# Patient Record
Sex: FEMALE | Race: BLACK OR AFRICAN AMERICAN | Employment: FULL TIME | URBAN - METROPOLITAN AREA
[De-identification: names, ages, dates, MRNs, and addresses within clinical notes are randomized per-mention and may not be internally consistent; named-entity substitution may affect disease eponyms.]

---

## 2021-10-14 ENCOUNTER — HOSPITAL ENCOUNTER (INPATIENT)
Age: 31
LOS: 7 days | Discharge: HOME | DRG: 756 | End: 2021-10-21
Attending: FAMILY MEDICINE | Admitting: PSYCHIATRY & NEUROLOGY
Payer: COMMERCIAL

## 2021-10-14 DIAGNOSIS — F29 PSYCHOSIS, UNSPECIFIED PSYCHOSIS TYPE (HCC): Primary | ICD-10-CM

## 2021-10-14 LAB
AMPHET UR QL SCN: NEGATIVE
ANION GAP SERPL CALC-SCNC: 6 MMOL/L (ref 5–15)
APAP SERPL-MCNC: <10 UG/ML (ref 10–30)
APPEARANCE UR: ABNORMAL
BACTERIA URNS QL MICRO: NEGATIVE /HPF
BARBITURATES UR QL SCN: NEGATIVE
BASOPHILS # BLD: 0 K/UL (ref 0–0.1)
BASOPHILS NFR BLD: 0 % (ref 0–1)
BENZODIAZ UR QL: NEGATIVE
BILIRUB UR QL: NEGATIVE
BUN SERPL-MCNC: 4 MG/DL (ref 6–20)
BUN/CREAT SERPL: 4 (ref 12–20)
CA-I BLD-MCNC: 9.2 MG/DL (ref 8.5–10.1)
CANNABINOIDS UR QL SCN: NEGATIVE
CHLORIDE SERPL-SCNC: 103 MMOL/L (ref 97–108)
CO2 SERPL-SCNC: 27 MMOL/L (ref 21–32)
COCAINE UR QL SCN: NEGATIVE
COLOR UR: ABNORMAL
CREAT SERPL-MCNC: 0.9 MG/DL (ref 0.55–1.02)
DIFFERENTIAL METHOD BLD: ABNORMAL
DRUG SCRN COMMENT,DRGCM: NORMAL
EOSINOPHIL # BLD: 0 K/UL (ref 0–0.4)
EOSINOPHIL NFR BLD: 0 % (ref 0–7)
ERYTHROCYTE [DISTWIDTH] IN BLOOD BY AUTOMATED COUNT: 12.4 % (ref 11.5–14.5)
GLUCOSE SERPL-MCNC: 132 MG/DL (ref 65–100)
GLUCOSE UR STRIP.AUTO-MCNC: NEGATIVE MG/DL
HCG UR QL: NEGATIVE
HCT VFR BLD AUTO: 43.1 % (ref 35–47)
HGB BLD-MCNC: 14.1 G/DL (ref 11.5–16)
HGB UR QL STRIP: NEGATIVE
IMM GRANULOCYTES # BLD AUTO: 0 K/UL (ref 0–0.04)
IMM GRANULOCYTES NFR BLD AUTO: 0 % (ref 0–0.5)
KETONES UR QL STRIP.AUTO: NEGATIVE MG/DL
LEUKOCYTE ESTERASE UR QL STRIP.AUTO: ABNORMAL
LYMPHOCYTES # BLD: 1.4 K/UL (ref 0.8–3.5)
LYMPHOCYTES NFR BLD: 13 % (ref 12–49)
MCH RBC QN AUTO: 27.9 PG (ref 26–34)
MCHC RBC AUTO-ENTMCNC: 32.7 G/DL (ref 30–36.5)
MCV RBC AUTO: 85.3 FL (ref 80–99)
METHADONE UR QL: NEGATIVE
MONOCYTES # BLD: 0.6 K/UL (ref 0–1)
MONOCYTES NFR BLD: 5 % (ref 5–13)
MUCOUS THREADS URNS QL MICRO: ABNORMAL /LPF
NEUTS SEG # BLD: 9 K/UL (ref 1.8–8)
NEUTS SEG NFR BLD: 82 % (ref 32–75)
NITRITE UR QL STRIP.AUTO: NEGATIVE
NRBC # BLD: 0 K/UL (ref 0–0.01)
NRBC BLD-RTO: 0 PER 100 WBC
OPIATES UR QL: NEGATIVE
PCP UR QL: NEGATIVE
PH UR STRIP: 6 [PH] (ref 5–8)
PLATELET # BLD AUTO: 418 K/UL (ref 150–400)
PMV BLD AUTO: 9.4 FL (ref 8.9–12.9)
POTASSIUM SERPL-SCNC: 3.5 MMOL/L (ref 3.5–5.1)
PROT UR STRIP-MCNC: NEGATIVE MG/DL
RBC # BLD AUTO: 5.05 M/UL (ref 3.8–5.2)
RBC #/AREA URNS HPF: ABNORMAL /HPF (ref 0–5)
SALICYLATES SERPL-MCNC: <1.7 MG/DL (ref 2.8–20)
SARS-COV-2, COV2: NOT DETECTED
SODIUM SERPL-SCNC: 136 MMOL/L (ref 136–145)
SP GR UR REFRACTOMETRY: <1.005 (ref 1–1.03)
UA: UC IF INDICATED,UAUC: ABNORMAL
UROBILINOGEN UR QL STRIP.AUTO: 0.1 EU/DL (ref 0.1–1)
WBC # BLD AUTO: 11 K/UL (ref 3.6–11)
WBC URNS QL MICRO: ABNORMAL /HPF (ref 0–4)

## 2021-10-14 PROCEDURE — 80307 DRUG TEST PRSMV CHEM ANLYZR: CPT

## 2021-10-14 PROCEDURE — 99285 EMERGENCY DEPT VISIT HI MDM: CPT

## 2021-10-14 PROCEDURE — 80048 BASIC METABOLIC PNL TOTAL CA: CPT

## 2021-10-14 PROCEDURE — 81001 URINALYSIS AUTO W/SCOPE: CPT

## 2021-10-14 PROCEDURE — 65220000003 HC RM SEMIPRIVATE PSYCH

## 2021-10-14 PROCEDURE — 81025 URINE PREGNANCY TEST: CPT

## 2021-10-14 PROCEDURE — 87635 SARS-COV-2 COVID-19 AMP PRB: CPT

## 2021-10-14 PROCEDURE — 80143 DRUG ASSAY ACETAMINOPHEN: CPT

## 2021-10-14 PROCEDURE — 80179 DRUG ASSAY SALICYLATE: CPT

## 2021-10-14 PROCEDURE — 74011250637 HC RX REV CODE- 250/637: Performed by: PSYCHIATRY & NEUROLOGY

## 2021-10-14 PROCEDURE — 36415 COLL VENOUS BLD VENIPUNCTURE: CPT

## 2021-10-14 PROCEDURE — 85025 COMPLETE CBC W/AUTO DIFF WBC: CPT

## 2021-10-14 RX ORDER — DIPHENHYDRAMINE HYDROCHLORIDE 50 MG/ML
50 INJECTION, SOLUTION INTRAMUSCULAR; INTRAVENOUS
Status: DISCONTINUED | OUTPATIENT
Start: 2021-10-14 | End: 2021-10-19 | Stop reason: SDUPTHER

## 2021-10-14 RX ORDER — HYDROXYZINE 25 MG/1
25 TABLET, FILM COATED ORAL
Status: DISCONTINUED | OUTPATIENT
Start: 2021-10-14 | End: 2021-10-19 | Stop reason: SDUPTHER

## 2021-10-14 RX ORDER — BENZTROPINE MESYLATE 1 MG/1
1 TABLET ORAL
Status: DISCONTINUED | OUTPATIENT
Start: 2021-10-14 | End: 2021-10-19 | Stop reason: SDUPTHER

## 2021-10-14 RX ORDER — TRAZODONE HYDROCHLORIDE 50 MG/1
50 TABLET ORAL
Status: DISCONTINUED | OUTPATIENT
Start: 2021-10-14 | End: 2021-10-19 | Stop reason: SDUPTHER

## 2021-10-14 RX ORDER — HALOPERIDOL 5 MG/ML
5 INJECTION INTRAMUSCULAR
Status: DISCONTINUED | OUTPATIENT
Start: 2021-10-14 | End: 2021-10-19 | Stop reason: SDUPTHER

## 2021-10-14 RX ORDER — HYDROXYZINE 50 MG/1
50 TABLET, FILM COATED ORAL
Status: DISCONTINUED | OUTPATIENT
Start: 2021-10-14 | End: 2021-10-14

## 2021-10-14 RX ORDER — LORAZEPAM 2 MG/ML
1 INJECTION INTRAMUSCULAR
Status: DISCONTINUED | OUTPATIENT
Start: 2021-10-14 | End: 2021-10-19 | Stop reason: SDUPTHER

## 2021-10-14 RX ORDER — ADHESIVE BANDAGE
30 BANDAGE TOPICAL DAILY PRN
Status: DISCONTINUED | OUTPATIENT
Start: 2021-10-14 | End: 2021-10-19 | Stop reason: SDUPTHER

## 2021-10-14 RX ORDER — HYDROXYZINE PAMOATE 25 MG/1
25 CAPSULE ORAL
Status: ON HOLD | COMMUNITY
End: 2021-10-21 | Stop reason: SDUPTHER

## 2021-10-14 RX ORDER — BUPROPION HYDROCHLORIDE 150 MG/1
150 TABLET ORAL
COMMUNITY
End: 2021-10-21

## 2021-10-14 RX ORDER — ACETAMINOPHEN 325 MG/1
650 TABLET ORAL
Status: DISCONTINUED | OUTPATIENT
Start: 2021-10-14 | End: 2021-10-19 | Stop reason: SDUPTHER

## 2021-10-14 RX ORDER — OLANZAPINE 5 MG/1
5 TABLET ORAL
Status: DISCONTINUED | OUTPATIENT
Start: 2021-10-14 | End: 2021-10-19 | Stop reason: SDUPTHER

## 2021-10-14 RX ADMIN — ACETAMINOPHEN 650 MG: 325 TABLET ORAL at 10:52

## 2021-10-14 RX ADMIN — HYDROXYZINE HYDROCHLORIDE 25 MG: 25 TABLET, FILM COATED ORAL at 17:15
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
## 2021-10-14 NOTE — GROUP NOTE
IP  GROUP DOCUMENTATION INDIVIDUAL                                                                          Group Therapy Note    Date: 10/14/2021    Group Start Time: 1120  Group End Time: 1200  Group Topic: Education Group - Inpatient    SRM 2  NON ACUTE    Leora Mcclain    IP [facility] GROUP DOCUMENTATION GROUP    Group Therapy Note    Facilitated discussion focused on strength exploration and understanding how they are used and learning new ways to apply them    Attendees: 6/14         Attendance: Attended    Patient's Goal:  Attend group daily     Interventions/techniques: Informed and Supported    Follows Directions: Followed directions    Interactions: Interacted appropriately    Mental Status: Calm    Behavior/appearance: Cooperative    Goals Achieved: Able to engage in interactions, Able to listen to others and Able to self-disclose      Additional Notes:  Receptive to information and engaged in discussion. Pt shared \"open mindedness, humor and gratitude\" as her current strengths and was able to identify ways she was already using them.     Broderick Spurling, CTRS

## 2021-10-14 NOTE — GROUP NOTE
KENNETH LEYVA GROUP DOCUMENTATION INDIVIDUAL                                                                          Group Therapy Note    Date: 10/14/2021    Group Start Time: 1400  Group End Time: 56  Group Topic: AA/NA    SRM 2 [facility] TH ACUTE    Savannah Zhu  GROUP DOCUMENTATION GROUP    Group Therapy Note    Attendees: 3/8    AA/NA: pts were asked to share what they are wanting out of life as a check in question. Pts were then encouraged to share things that have helped them in their recovery and what they are hopeful for.           Attendance: Did not attend  Additional Notes:  Pt briefly attended and left, did not return    [address]

## 2021-10-14 NOTE — GROUP NOTE
KENNETH  GROUP DOCUMENTATION INDIVIDUAL                                                                          Group Therapy Note    Date: 10/14/2021    Group Start Time: 0900  Group End Time: 0945  Group Topic: [employer] Meeting    [facility] 2 BEHA [facility] ACUTE    [facility] GROUP DOCUMENTATION GROUP    Group Therapy Note    Attendees: 7         Attendance: Attended    Patient's Goal:  Clarisse Cools than yesterday    Interventions/techniques: Informed    Follows Directions:  Followed directions    Interactions: Interacted appropriately    Mental Status: Calm    Behavior/appearance: Cooperative    Goals Achieved: Able to engage in interactions      Additional Notes:      Guanako Ribeiro

## 2021-10-14 NOTE — ED TRIAGE NOTES
Pt states she took her night time medication and is possessed now Hearing things. States it has been going on for a while and states her thoughts are very loud and states voices are telling her to leave her appt and she is fighting them off and she states \"what if I eat you and in that moment I was tricked\" ems states she called for chest pain. Pt denies chest pain.

## 2021-10-14 NOTE — ROUTINE PROCESS
TRANSFER - OUT REPORT:    Verbal report given to Fidel Rx (name) on Aravind Friend  being transferred to [address] [place] [address] (unit) for routine progression of care       Report consisted of patients Situation, Background, Assessment and   Recommendations(SBAR). Information from the following report(s) SBAR and ED Summary was reviewed with the receiving nurse. Lines:       Opportunity for questions and clarification was provided.       Patient transported with:   [employer]

## 2021-10-14 NOTE — GROUP NOTE
KENNETH  GROUP DOCUMENTATION INDIVIDUAL                                                                          Group Therapy Note    Date: 10/14/2021    Group Start Time: 1000  Group End Time: 56  Group Topic: Nursing    SRM 2 [facility] ACUTE    ADALGISA Parks  GROUP DOCUMENTATION GROUP    Group Therapy Note    Attendees:          Attendance: Did not attend    Patient's Goal:      Interventions/techniques: Follows Directions:     Interactions:     Mental Status:     Behavior/appearance:     Goals Achieved:        Additional Notes:      Britney Merida LPN

## 2021-10-14 NOTE — PROGRESS NOTES
Problem: Suicide  Goal: *STG: Remains safe in hospital  Outcome: Progressing Towards Goal  Goal: *STG: Seeks staff when feelings of self harm or harm towards others arise  Outcome: Progressing Towards Goal  Goal: *STG: Attends activities and groups  Outcome: Progressing Towards Goal  Goal: *STG/LTG: Complies with medication therapy  Outcome: Progressing Towards Goal

## 2021-10-14 NOTE — GROUP NOTE
KENNETH  GROUP DOCUMENTATION INDIVIDUAL                                                                          Group Therapy Note    Date: 10/14/2021    Group Start Time: 1000  Group End Time: 56  Group Topic: Nursing    SR[facility] 2 [facility] ACUTE    ADALGISA Young  GROUP DOCUMENTATION GROUP    Group Therapy Note    Attendees:          Attendance: Did not attend    Patient's Goal:      Interventions/techniques: Follows Directions:     Interactions:     Mental Status:     Behavior/appearance:     Goals Achieved:        Additional Notes:      Denys Bermudez LPN

## 2021-10-14 NOTE — GROUP NOTE
KENNETH  GROUP DOCUMENTATION INDIVIDUAL                                                                          Group Therapy Note    Date: 10/14/2021    Group Start Time: 4216  Group End Time: 1400  Group Topic: Process Group - Inpatient    SRM CARE MANAGEMENT    Marcela Lugo    IP 1150 [facility] GROUP DOCUMENTATION GROUP    Group Therapy Note    Attendees: 4/10    Patients were asked how they were feeling as a check-in question. Pts were then encouraged to participate in an activity with a focus on anger management. Pts were to share coping strategies and other helpful ways they help decrease symptoms of anger         Attendance: Attended    Patient's Goal:  Pt responded to the check-in that she was feeling okay    Interventions/techniques: Informed and Validated    Follows Directions: Followed directions    Interactions: Interacted appropriately    Mental Status: Calm and Congruent    Behavior/appearance: Attentive and Cooperative    Goals Achieved: Able to listen to others, Able to give feedback to another, Able to reflect/comment on own behavior and Able to manage/cope with feelings      Additional Notes:  Pt was attentive during group and participated during the activity.  The pt was able to self-reflect and identify different triggers and feelings    Clint Molina

## 2021-10-14 NOTE — GROUP NOTE
IP  GROUP DOCUMENTATION INDIVIDUAL                                                                          Group Therapy Note    Date: 10/14/2021    Group Start Time: 1520  Group End Time: 1044  Group Topic: Recreational/Music Therapy    SRM 2  NON ACUTE    Juan Kearney    IP 1150 [facility] GROUP DOCUMENTATION GROUP    Group Therapy Note    Facilitated leisure skills group to reinforce positive coping through music, social interaction, group activities and arts/crafts    Attendees: 8/14         Attendance: Attended    Patient's Goal:  Attend group daily    Interventions/techniques: Art integration and Supported    Follows Directions: Followed directions    Interactions: Interacted appropriately    Mental Status: Calm and Preoccupied    Behavior/appearance: Cooperative    Goals Achieved: Able to engage in interactions and Able to listen to others      Additional Notes:  Receptive to listening to music and a song she selected. Interacted with peers and staff when prompted.  Declined to work on leisure task    Maricruz Hdz, [address]

## 2021-10-14 NOTE — ED PROVIDER NOTES
HPI   81-year-old with a past medical history of anxiety and depression who is here today complaining of anxiety and depression. Patient also told her nurse that she was hearing voices, and that she took her medication and is now possessed. She denies any suicidal or homicidal ideation. 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 9/20/2013    Depression     Psychiatric disorder        Past Surgical History:   Procedure Laterality Date    HX HERNIA REPAIR      childhood         Family History:   Problem Relation Age of Onset    Cancer Mother         stomach cancer    Heart Disease Father     Kidney Disease Father         with dialysis    Hypertension Paternal Uncle        Social History     Socioeconomic History    Marital status: SINGLE     Spouse name: Not on file    Number of children: Not on file    Years of education: Not on file    Highest education level: Not on file   Occupational History    Not on file   Tobacco Use    Smoking status: Former Smoker    Smokeless tobacco: Never Used   Substance and Sexual Activity    Alcohol use: Not Currently    Drug use: No    Sexual activity: Yes     Partners: Male     Birth control/protection: Pill   Other Topics Concern    Not on file   Social History Narrative    Not on file     Social Determinants of Health     Financial Resource Strain:     Difficulty of Paying Living Expenses:    Food Insecurity:     Worried About Running Out of Food in the Last Year:     [address] in the Last Year:    Transportation Needs:     Lack of Transportation (Medical):      Lack of Transportation (Non-Medical):    Physical Activity:     Days of Exercise per Week:     Minutes of Exercise per Session:    Stress:     Feeling of Stress :    Social Connections:     Frequency of Communication with Friends and Family:     Frequency of Social Gatherings with Friends and Family:     Attends Mandaeism Services:     Active Member of Clubs or Organizations:     Attends Club or Organization Meetings:     Marital Status:    Intimate Partner Violence:     Fear of Current or Ex-Partner:     Emotionally Abused:     Physically Abused:     Sexually Abused: ALLERGIES: Patient has no known allergies. Review of Systems   Constitutional: Negative for chills and fever. HENT: Negative for rhinorrhea and sore throat. Eyes: Negative for pain and redness. Respiratory: Negative for cough and shortness of breath. Cardiovascular: Negative for chest pain and leg swelling. Gastrointestinal: Negative for abdominal pain, nausea and vomiting. Endocrine: Negative for polydipsia and polyuria. Genitourinary: Negative for decreased urine volume, dysuria and frequency. Musculoskeletal: Negative for arthralgias and back pain. Skin: Negative for color change and rash. Allergic/Immunologic: Negative for environmental allergies, food allergies and immunocompromised state. Neurological: Negative for dizziness and headaches. Hematological: Negative for adenopathy. Does not bruise/bleed easily. Psychiatric/Behavioral: Positive for hallucinations. Negative for agitation. All other systems reviewed and are negative. Vitals:    10/14/21 0143   BP: 138/80   Pulse: (!) 111   Resp: 16   Temp: 99.2 °F (37.3 °C)   SpO2: 100%   Weight: 139.7 kg (308 lb)   Height: 5' 7\" (1.702 m)            Physical Exam  Vitals and nursing note reviewed. Constitutional:       Comments: Obese, well-hydrated, nontoxic-appearing   HENT:      Head: Normocephalic and atraumatic. Right Ear: External ear normal.      Left Ear: External ear normal.      Nose: Nose normal. No rhinorrhea. Mouth/Throat:      Mouth: Mucous membranes are moist.      Pharynx: Oropharynx is clear. Eyes:      General:         Right eye: No discharge. Left eye: No discharge. Cardiovascular:      Rate and Rhythm: Normal rate and regular rhythm. Pulses: Normal pulses. Heart sounds: Normal heart sounds. Pulmonary:      Effort: Pulmonary effort is normal.      Breath sounds: Normal breath sounds. Abdominal:      General: Abdomen is flat. Palpations: Abdomen is soft. Musculoskeletal:         General: No deformity or signs of injury. Cervical back: Normal range of motion and neck supple. Skin:     General: Skin is warm and dry. Capillary Refill: Capillary refill takes less than 2 seconds. Neurological:      General: No focal deficit present. Mental Status: She is alert and oriented to person, place, and time. Psychiatric:      Comments: Poor insight and judgment        Reevaluation 0700:  Unchanged. Discussed results and plan for admission. Dr. Darren Bermudez of psychiatry accepting at this time.

## 2021-10-15 PROCEDURE — 65220000003 HC RM SEMIPRIVATE PSYCH

## 2021-10-15 PROCEDURE — 74011250637 HC RX REV CODE- 250/637: Performed by: PSYCHIATRY & NEUROLOGY

## 2021-10-15 RX ORDER — OLANZAPINE 5 MG/1
5 TABLET ORAL
Status: DISCONTINUED | OUTPATIENT
Start: 2021-10-15 | End: 2021-10-21 | Stop reason: HOSPADM

## 2021-10-15 RX ORDER — HALOPERIDOL 5 MG/ML
5 INJECTION INTRAMUSCULAR
Status: DISCONTINUED | OUTPATIENT
Start: 2021-10-15 | End: 2021-10-21 | Stop reason: HOSPADM

## 2021-10-15 RX ORDER — ACETAMINOPHEN 325 MG/1
650 TABLET ORAL
Status: DISCONTINUED | OUTPATIENT
Start: 2021-10-15 | End: 2021-10-21 | Stop reason: HOSPADM

## 2021-10-15 RX ORDER — BENZTROPINE MESYLATE 1 MG/1
1 TABLET ORAL
Status: DISCONTINUED | OUTPATIENT
Start: 2021-10-15 | End: 2021-10-21 | Stop reason: HOSPADM

## 2021-10-15 RX ORDER — HYDROXYZINE 50 MG/1
50 TABLET, FILM COATED ORAL
Status: DISCONTINUED | OUTPATIENT
Start: 2021-10-15 | End: 2021-10-21 | Stop reason: HOSPADM

## 2021-10-15 RX ORDER — LORAZEPAM 2 MG/ML
1 INJECTION INTRAMUSCULAR
Status: DISCONTINUED | OUTPATIENT
Start: 2021-10-15 | End: 2021-10-21 | Stop reason: HOSPADM

## 2021-10-15 RX ORDER — DIPHENHYDRAMINE HYDROCHLORIDE 50 MG/ML
50 INJECTION, SOLUTION INTRAMUSCULAR; INTRAVENOUS
Status: DISCONTINUED | OUTPATIENT
Start: 2021-10-15 | End: 2021-10-21 | Stop reason: HOSPADM

## 2021-10-15 RX ORDER — ADHESIVE BANDAGE
30 BANDAGE TOPICAL DAILY PRN
Status: DISCONTINUED | OUTPATIENT
Start: 2021-10-15 | End: 2021-10-21 | Stop reason: HOSPADM

## 2021-10-15 RX ORDER — TRAZODONE HYDROCHLORIDE 50 MG/1
50 TABLET ORAL
Status: DISCONTINUED | OUTPATIENT
Start: 2021-10-15 | End: 2021-10-21 | Stop reason: HOSPADM

## 2021-10-15 RX ADMIN — TRAZODONE HYDROCHLORIDE 50 MG: 50 TABLET ORAL at 21:03

## 2021-10-15 RX ADMIN — HYDROXYZINE HYDROCHLORIDE 50 MG: 50 TABLET, FILM COATED ORAL at 21:03

## 2021-10-15 RX ADMIN — LURASIDONE HYDROCHLORIDE 20 MG: 20 TABLET, FILM COATED ORAL at 16:46

## 2021-10-15 NOTE — CONSULTS
Consult Date: 10/15/2021    Chief Complaint:   Chief Complaint   Patient presents with   [address] Problem   No complaints   HPI: HPI patient is a 28years old -American female who has been admitted here for psychiatry evaluation and treatment she denies any history of cough fever or chills no history of chest pain or shortness of breath no history of nausea vomiting diarrhea abdominal pain or black stool. No history of increased frequency of micturition or painful micturition no history of joint pain or joint swelling no history of headache or dizziness no history of loss of consciousness or seizures no history of change in appetite or change in weight. No history of change in vision.   ROS:ROS   Constitutional: Negative   HEENT: Negative  CVS: Negative  RS: Negative  GI: Negative  : Negative  Musculoskeletal: Negative  Immunology: Records are not available  Neurology: Negative  Endocrine: Negative  Haem-Onc: Negative  Skin: Negative  Psychiatry: Depression  Allergies  No Known Allergies  FAMILY HISTORY:  Family History   Problem Relation Age of Onset    Cancer Mother         stomach cancer    Heart Disease Father     Kidney Disease Father         with dialysis    Hypertension Paternal Uncle      SOCIAL HISTORY:  Social History     Socioeconomic History    Marital status: SINGLE     Spouse name: Not on file    Number of children: Not on file    Years of education: Not on file    Highest education level: Not on file   Occupational History    Not on file   Tobacco Use    Smoking status: Former Smoker    Smokeless tobacco: Never Used   Substance and Sexual Activity    Alcohol use: Not Currently    Drug use: No    Sexual activity: Yes     Partners: Male     Birth control/protection: Pill   Other Topics Concern    Not on file   Social History Narrative    Not on file     Social Determinants of Health     Financial Resource Strain:     Difficulty of Paying Living Expenses:    Food Insecurity:     Worried About Running Out of Food in the Last Year:     [address] in the Last Year:    Transportation Needs:     Lack of Transportation (Medical):  Lack of Transportation (Non-Medical):    Physical Activity:     Days of Exercise per Week:     Minutes of Exercise per Session:    Stress:     Feeling of Stress :    Social Connections:     Frequency of Communication with Friends and Family:     Frequency of Social Gatherings with Friends and Family:     Attends Gnosticism Services:     Active Member of Clubs or Organizations:     Attends Club or Organization Meetings:     Marital Status:    Intimate Partner Violence:     Fear of Current or Ex-Partner:     Emotionally Abused:     Physically Abused:     Sexually Abused:    Patient does not smoke drink or does not abuse any drugs    SURGICAL HISTORY:  Past Surgical History:   Procedure Laterality Date    HX HERNIA REPAIR      childhood     PMH:  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 Authorizing Provider   buPROPion XL (Wellbutrin XL) 150 mg tablet Take 150 mg by mouth nightly. Indications: anxiousness associated with depression   Yes Provider, Historical   hydrOXYzine pamoate (VistariL) 25 mg capsule Take 25 mg by mouth three (3) times daily as needed.  Indications: anxious   Yes Provider, Historical     Vitals:  Patient Vitals for the past 24 hrs:   Temp Pulse Resp BP SpO2   10/15/21 1906 97.4 °F (36.3 °C) 98 18 106/60 99 %   10/15/21 0821 97.4 °F (36.3 °C) (!) 104 20 (!) 100/53    10/14/21 2100 98.2 °F (36.8 °C) (!) 118 22 109/71 99 %        General Examination: Physical Exam patient is a 52-year-old -American female morbidly obese not in any acute distress alert awake oriented x3  HEENT: Normocephalic atraumatic skull pupils are bilaterally equal reacting to sclera nonicteric conjunctive pink no edema of the eyelids no yellow nasal discharge tongue is pink no ulcer positive gag reflex no pharyngeal inflammation extraocular movements are intact  Neck: Supple for range of motion no JVD no general lymphadenopathy no thyromegaly no carotid bruit  Chest: Expands well no localized swelling or tenderness  RS: Clear breath sounds no rhonchi no rales  CVS: S1-S2 audible regular no murmur gallop or pericardial rub  Abdomen: Morbidly obese no tenderness no organomegaly   extremeties: No edema no clubbing no cyanosis peripheral pulses plus  CNS: Grossly unremarkable cranial nerves I to XII are individually checked and they are intact muscle power 5/5 reflexes 2+ plantars downgoing no sensory abnormality or deficit  Finger-to-nose is negative  Romberg sign is negative          LABS:    CXR Results  (Last 48 hours)    None          No results found for this or any previous visit (from the past 12 hour(s)).          No orders to display        ASSESSMENT/PLAN: WBC count is 11,000 but there is no signs and symptoms of any infection so I will repeat CBC in the morning  Morbid obesity advised to exercise and diet  Depression management as per psychiatrist  Patient is medically stable for psychiatry evaluation and treatment she can participate in all activities without any reservations        7:31 PM, 10/15/21  Nelly Roche MD
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
## 2021-10-15 NOTE — GROUP NOTE
IP  GROUP DOCUMENTATION INDIVIDUAL                                                                          Group Therapy Note    Date: 10/15/2021    Group Start Time: 4408  Group End Time: 1600  Group Topic: Recreational/Music Therapy    SRM 2  NON ACUTE    Leora Mcclain    IP [address]150 [facility] GROUP DOCUMENTATION GROUP    Group Therapy Note    Facilitated leisure skills group to reinforce positive coping through music, social interaction, group activities and arts/crafts    Attendees: 6/12         Attendance: Attended    Patient's Goal:  Attend group daily     Interventions/techniques: Art integration and Supported    Follows Directions: Followed directions    Interactions: Interacted appropriately    Mental Status: Calm    Behavior/appearance: Cooperative    Goals Achieved: Able to engage in interactions and Able to listen to others      Additional Notes:  Receptive to listening to music and songs she selected while working on leisure task. Interacted with peers and staff.      Broderick Spurling, CTRS

## 2021-10-15 NOTE — GROUP NOTE
IP  GROUP DOCUMENTATION INDIVIDUAL                                                                          Group Therapy Note    Date: 10/15/2021    Group Start Time: 1130  Group End Time: 8799  Group Topic: Education Group - Inpatient    [facility]  NON ACUTE    Donna Mendoza    IP [facility] GROUP DOCUMENTATION GROUP    Group Therapy Note    Facilitated discussion focus on understanding and developing healthy boundaries to improve relationships    Attendees: 6/14         Attendance: Attended    Patient's Goal:  Attend group daily     Interventions/techniques: Informed and Supported    Follows Directions:  Followed directions    Interactions: Interacted appropriately    Mental Status: Calm    Behavior/appearance: Cooperative    Goals Achieved: Able to engage in interactions, Able to listen to others and Able to self-disclose      Additional Notes:  Receptive to information discussed on healthy and unhealthy boundaries and was able to share some of them that applied to her such as \" allowing others to hurt her and have limits and recognize what they are\"    Meghan Reyna

## 2021-10-15 NOTE — BH NOTES
Patient came up to this staff member and requested to change rooms. Patient states that \"their are evil spirits in her room and that they are saying that she does not belong in that room. Patient met with  and told her that she had two demons inside of her and she ate one. Patient is tearful off and on today and has been talking with various staff members. She will be starting [email or web address] today. Offered patient Vistaril for anxiety. Initially she was going to take it then decided it would not help with what she was experiencing. Message sent to pharmacy madeleine armenta send the medication. Continuing to monitor as patient having delusional and intrusive thoughts.

## 2021-10-15 NOTE — PROGRESS NOTES
Problem: Suicide  Goal: *STG: Remains safe in hospital  Outcome: Progressing Towards Goal  Pt cooperative and pleasant during interactions with peers in the dayroom latter part of shift. Pt talked in length with U/s about hiow she has experienced with tarots and spirits and feels the spirits are trying to take her being away from her. Pt denied voices telling her to harm herself a or anyone else. Pt stated she is unable to figure out why the spirits will not leave her alone and is requesting a CT or MRI of he head. Pt talked about taking meds for depression and anxiety but does not feel the meds are effective. Pt asked about antipsychotics and their functionality fir what she is experiencing. Pt stated she came to the hospital voluntarily with hopes of talking with a gavin or a therapist to run the spirits away but it has not happened. Pt stated she is willing to discuss these concerns with her psychiatrist.  Pt denied a/v hallucinations, but they are spirits. Pt encouraged to seek staff as needed.

## 2021-10-15 NOTE — GROUP NOTE
KENNETH  GROUP DOCUMENTATION INDIVIDUAL                                                                          Group Therapy Note    Date: 10/15/2021    Group Start Time: 6229  Group End Time: 1400  Group Topic: Process Group - Inpatient    [facility]NAG[facility]    Teresa Adkins BSW    [facility] GROUP DOCUMENTATION GROUP    Group Therapy Note    The facilitator used this time to check in with the clients and discuss new things they have learned. As well as promote feedback and support to one another. Attendees: 6/14          Attendance: Attended    Patient's Goal: Make friends     Interventions/techniques: Informed, Promoted peer support, Provide feedback and Supported    Follows Directions: Followed directions    Interactions: Interacted appropriately    Mental Status: Anxious and Flat    Behavior/appearance: Cooperative and Withdrawn/quiet    Goals Achieved: Able to engage in interactions, Able to listen to others, Able to give feedback to another, Able to reflect/comment on own behavior, Able to receive feedback and Able to self-disclose      Additional Notes: The pt talked about how she feels lonely as she works from home and lives by herself and that being at the hospital has been beneficial as she gets to be around people. The writer encouraged her to join communities that have similar likes so she can make friends.      Therese Lees, MAGANW

## 2021-10-15 NOTE — BH NOTES
PSA PART II ADDITIONAL INFORMATION        Access To Fire Arms: No    Substance Use: YES    Last Use: three days ago    Type of Substance: THC use    Frequency of Use: weekly    Request to See : YES    If yes, notified : YES    Release of Information Signed: NO    Release of Information Signed For:     Treatment Plan: pt reviewed and signed treatment plan

## 2021-10-16 LAB
ALBUMIN SERPL-MCNC: 3.3 G/DL (ref 3.5–5)
ALBUMIN/GLOB SERPL: 0.9 {RATIO} (ref 1.1–2.2)
ALP SERPL-CCNC: 117 U/L (ref 45–117)
ALT SERPL-CCNC: 125 U/L (ref 12–78)
ANION GAP SERPL CALC-SCNC: 6 MMOL/L (ref 5–15)
AST SERPL W P-5'-P-CCNC: 70 U/L (ref 15–37)
BASOPHILS # BLD: 0 K/UL (ref 0–0.1)
BASOPHILS NFR BLD: 0 % (ref 0–1)
BILIRUB SERPL-MCNC: 0.7 MG/DL (ref 0.2–1)
BUN SERPL-MCNC: 7 MG/DL (ref 6–20)
BUN/CREAT SERPL: 9 (ref 12–20)
CA-I BLD-MCNC: 9.2 MG/DL (ref 8.5–10.1)
CHLORIDE SERPL-SCNC: 105 MMOL/L (ref 97–108)
CHOLEST SERPL-MCNC: 132 MG/DL
CO2 SERPL-SCNC: 27 MMOL/L (ref 21–32)
CREAT SERPL-MCNC: 0.79 MG/DL (ref 0.55–1.02)
DIFFERENTIAL METHOD BLD: ABNORMAL
EOSINOPHIL # BLD: 0 K/UL (ref 0–0.4)
EOSINOPHIL NFR BLD: 0 % (ref 0–7)
ERYTHROCYTE [DISTWIDTH] IN BLOOD BY AUTOMATED COUNT: 12.5 % (ref 11.5–14.5)
GLOBULIN SER CALC-MCNC: 3.6 G/DL (ref 2–4)
GLUCOSE SERPL-MCNC: 100 MG/DL (ref 65–100)
HCT VFR BLD AUTO: 42.1 % (ref 35–47)
HDLC SERPL-MCNC: 37 MG/DL
HDLC SERPL: 3.6 {RATIO} (ref 0–5)
HGB BLD-MCNC: 13.9 G/DL (ref 11.5–16)
IMM GRANULOCYTES # BLD AUTO: 0 K/UL (ref 0–0.04)
IMM GRANULOCYTES NFR BLD AUTO: 0 % (ref 0–0.5)
LDLC SERPL CALC-MCNC: 83.6 MG/DL (ref 0–100)
LIPID PROFILE,FLP: NORMAL
LYMPHOCYTES # BLD: 3.8 K/UL (ref 0.8–3.5)
LYMPHOCYTES NFR BLD: 40 % (ref 12–49)
MCH RBC QN AUTO: 28 PG (ref 26–34)
MCHC RBC AUTO-ENTMCNC: 33 G/DL (ref 30–36.5)
MCV RBC AUTO: 84.9 FL (ref 80–99)
MONOCYTES # BLD: 0.9 K/UL (ref 0–1)
MONOCYTES NFR BLD: 10 % (ref 5–13)
NEUTS SEG # BLD: 4.6 K/UL (ref 1.8–8)
NEUTS SEG NFR BLD: 50 % (ref 32–75)
NRBC # BLD: 0 K/UL (ref 0–0.01)
NRBC BLD-RTO: 0 PER 100 WBC
PLATELET # BLD AUTO: 378 K/UL (ref 150–400)
PMV BLD AUTO: 9.5 FL (ref 8.9–12.9)
POTASSIUM SERPL-SCNC: 3.8 MMOL/L (ref 3.5–5.1)
PROT SERPL-MCNC: 6.9 G/DL (ref 6.4–8.2)
RBC # BLD AUTO: 4.96 M/UL (ref 3.8–5.2)
SODIUM SERPL-SCNC: 138 MMOL/L (ref 136–145)
TRIGL SERPL-MCNC: 57 MG/DL (ref ?–150)
TSH SERPL DL<=0.05 MIU/L-ACNC: 2.1 UIU/ML (ref 0.36–3.74)
VLDLC SERPL CALC-MCNC: 11.4 MG/DL
WBC # BLD AUTO: 9.5 K/UL (ref 3.6–11)

## 2021-10-16 PROCEDURE — 85025 COMPLETE CBC W/AUTO DIFF WBC: CPT

## 2021-10-16 PROCEDURE — 84443 ASSAY THYROID STIM HORMONE: CPT

## 2021-10-16 PROCEDURE — 74011250637 HC RX REV CODE- 250/637: Performed by: PSYCHIATRY & NEUROLOGY

## 2021-10-16 PROCEDURE — 65220000003 HC RM SEMIPRIVATE PSYCH

## 2021-10-16 PROCEDURE — 36415 COLL VENOUS BLD VENIPUNCTURE: CPT

## 2021-10-16 PROCEDURE — 80053 COMPREHEN METABOLIC PANEL: CPT

## 2021-10-16 PROCEDURE — 80061 LIPID PANEL: CPT

## 2021-10-16 RX ADMIN — LURASIDONE HYDROCHLORIDE 20 MG: 20 TABLET, FILM COATED ORAL at 17:45

## 2021-10-16 RX ADMIN — TRAZODONE HYDROCHLORIDE 50 MG: 50 TABLET ORAL at 21:26

## 2021-10-16 RX ADMIN — HYDROXYZINE HYDROCHLORIDE 25 MG: 25 TABLET, FILM COATED ORAL at 12:48

## 2021-10-16 RX ADMIN — HYDROXYZINE HYDROCHLORIDE 50 MG: 50 TABLET, FILM COATED ORAL at 21:26
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
## 2021-10-16 NOTE — BH NOTES
Behavioral Health Treatment Team Note     Patient goal(s) for today: Be present   Treatment team focus/goals: Attend group and continue medication management     Progress note: The pt presented reading when the writer entered the room. She described her mood as 'alright' and presented a calm mood with a detached affect. She denied SI/HI but endorsed AVH saying she hears voices and sees the energy coming off of lights. The pt was oriented x3 and appeared to be responding to internal stimuli. The pt said that she requested more information about what her medication is doing to her brain from the nurses because she's curious and that she has spent time recognizing her triggers. Them being, feeling like a burden, being alone, and not being safe and that she is working on them. An inpatient level of care is needed at this time for therapeutic intervention and symptom management. LOS:  2  Expected LOS: 5-7 Days     Insurance info/prescription coverage: Medicaid   Date of last family contact: pt did not disclose   Family requesting physician contact today:  no  Discharge plan: Will be discussed prior to dc w/ pt   Guns in the home:  no   Outpatient provider(s):   Will be coordinated prior to d/c    Participating treatment team members: Adrien Burgos, * (assigned SW), [facility]

## 2021-10-16 NOTE — GROUP NOTE
KENNETH  GROUP DOCUMENTATION INDIVIDUAL                                                                          Group Therapy Note    Date: 10/16/2021    Group Start Time: 7472  Group End Time: 0945  Group Topic: Community Meeting    [facility] GROUP DOCUMENTATION GROUP    Group Therapy Note    Attendees:         Attendance: Did not attend    Patient's Goal:    Interventions/techniques: Follows Directions:     Interactions:     Mental Status:     Behavior/appearance:     Goals Achieved: Additional Notes:  Patient did not attend group.     Wilbur Mata

## 2021-10-16 NOTE — GROUP NOTE
KENNETH  GROUP DOCUMENTATION INDIVIDUAL                                                                          Group Therapy Note    Date: 10/16/2021    Group Start Time: 1000  Group End Time: 56  Group Topic: Nursing    [facility]    ADALGISA Mane  GROUP DOCUMENTATION GROUP    Group Therapy Note    Attendees:         Attendance: Did not attend    Patient's Goal:      Interventions/techniques: Follows Directions:     Interactions:     Mental Status:     Behavior/appearance:     Goals Achieved:        Additional Notes:      Svetlana Zuniga LPN

## 2021-10-16 NOTE — BH NOTES
Nurse Note:    Patient observed in the hallway walking with peers and in the dayroom. Patient denies SI, HI, and VH. Patient reports auditory hallucinations of spirits in her head telling her to not fall asleep or take medications. Patient believes that the spirits are taking control of her body. Patient needed multiple verbal prompts to take PRN medication for insomnia. Staff encouraged her and gave support by sitting outside the door until she fell asleep; patient reports that if she falls asleep she will not wake up. Staff reassured her that she is safe. No report of pain or discomfort. No report of anxiety or depression, but does feel like she doesn't know what to do. Patient slept for an hour and went to the bathroom. Will continue to monitor for safety. Will continue to monitor for safety.

## 2021-10-16 NOTE — BH NOTES
Pt.is up and visible on unit, affect is flat, appetite good. appearance is neat, denies feeling suicidal or depressed,pt. continues to state she is hearing voices, remains guarded,pt.does want a print out of her current list of medications. pt.is accepting medications, interacting appropriately with peers. no concerns voiced, remains on close observation.

## 2021-10-16 NOTE — PROGRESS NOTES
Problem: Suicide  Goal: *STG: Remains safe in hospital  Outcome: Progressing Towards Goal     Problem: Suicide  Goal: *STG/LTG: Complies with medication therapy  Outcome: Progressing Towards Goal    Patient remains safe in the hospital and is compliant with medication. Patient denies SI and HI and reported that she feels that if she sleeps that she won't wake up. Staff provided reassurance that she is safe here and staff will round Q15 minutes.

## 2021-10-17 PROCEDURE — 90686 IIV4 VACC NO PRSV 0.5 ML IM: CPT | Performed by: PSYCHIATRY & NEUROLOGY

## 2021-10-17 PROCEDURE — 65220000003 HC RM SEMIPRIVATE PSYCH

## 2021-10-17 PROCEDURE — 90471 IMMUNIZATION ADMIN: CPT

## 2021-10-17 PROCEDURE — 74011250636 HC RX REV CODE- 250/636: Performed by: PSYCHIATRY & NEUROLOGY

## 2021-10-17 PROCEDURE — 74011250637 HC RX REV CODE- 250/637: Performed by: PSYCHIATRY & NEUROLOGY

## 2021-10-17 RX ADMIN — TRAZODONE HYDROCHLORIDE 50 MG: 50 TABLET ORAL at 20:29

## 2021-10-17 RX ADMIN — HYDROXYZINE HYDROCHLORIDE 50 MG: 50 TABLET, FILM COATED ORAL at 20:29

## 2021-10-17 RX ADMIN — HYDROXYZINE HYDROCHLORIDE 25 MG: 25 TABLET, FILM COATED ORAL at 12:51

## 2021-10-17 RX ADMIN — LURASIDONE HYDROCHLORIDE 20 MG: 20 TABLET, FILM COATED ORAL at 17:37

## 2021-10-17 RX ADMIN — INFLUENZA VIRUS VACCINE 0.5 ML: 15; 15; 15; 15 SUSPENSION INTRAMUSCULAR at 18:18

## 2021-10-17 NOTE — GROUP NOTE
KENNETH  GROUP DOCUMENTATION INDIVIDUAL                                                                          Group Therapy Note    Date: 10/17/2021    Group Start Time: 1540  Group End Time: 5218  Group Topic: Nursing    [facility]    Rain ADALGISA Gong  GROUP DOCUMENTATION GROUP    Group Therapy Note    Attendees:          Attendance: Attended    Patient's Goal:      Interventions/techniques: Supported    Follows Directions:  Followed directions    Interactions: Interacted appropriately    Mental Status: Calm    Behavior/appearance: Attentive    Goals Achieved: Able to engage in interactions      Additional Notes:      Jasmin Felix LPN

## 2021-10-17 NOTE — BH NOTES
Patient alert and oriented. Patient in the day room visible. Patient denies SI/HI/AVH. Patient rates depression 0/10 and anxiety 10/10. Patient states that her anxiety is increased because her room mate was moved to the front unit and it is hard for her to sleep in her room alone. Patient contracts for safety.

## 2021-10-17 NOTE — PROGRESS NOTES
Progress Note  Date:10/17/2021       Room:[facility]  Patient Name:Martha Morales     YOB: 1990     Age:31 y.o. Subjective    Subjective   Patient states she has been continuing to feel better every day. She still feels uncomfortable with certain feelings of being touched. She understands that she is continuing to make some progress. She denies having any active plan of suicidal homicidal or not feeling as angry or irritable as before.     Status examination-patient presents anxious alert oriented x3. Coherent conversation. Still presents with uncomfortable feelings with bodily feelings. She denies having any active command hallucinations. Her auditory hallucinations are improving. Not voiced any visual hallucinations today. Still presents with disturbed Process. Insight judgment coping continuing to improve. Review of Systems  Objective         Vitals Last 24 Hours:  TEMPERATURE:  Temp  Av.3 °F (36.8 °C)  Min: 97.9 °F (36.6 °C)  Max: 98.6 °F (37 °C)  RESPIRATIONS RANGE: Resp  Av.5  Min: 19  Max: 22  PULSE OXIMETRY RANGE: SpO2  Av.5 %  Min: 98 %  Max: 99 %  PULSE RANGE: Pulse  Av.5  Min: 79  Max: 106  BLOOD PRESSURE RANGE: Systolic (74XNI), NDF:535 , Min:117 , ZJM:633   ; Diastolic (49WAX), NYK:66, Min:78, Max:82    I/O (24Hr): No intake or output data in the 24 hours ending 10/17/21 1405  Objective  Labs/Imaging/Diagnostics    Labs:  CBC:Recent Labs     10/16/21  0600   WBC 9.5   RBC 4.96   HGB 13.9   HCT 42.1   MCV 84.9   RDW 12.5        CHEMISTRIES:  Recent Labs     10/16/21  0600      K 3.8      CO2 27   BUN 7   CA 9.2   PT/INR:No results for input(s): INR, INREXT in the last 72 hours. No lab exists for component: PROTIME  APTT:No results for input(s): APTT in the last 72 hours.   LIVER PROFILE:  Recent Labs     10/16/21  0600   AST 70*   *     Lab Results   Component Value Date/Time    ALT (SGPT) 125 (H) 10/16/2021 06:00 AM    AST (SGOT) 70 (H) 10/16/2021 06:00 AM    Alk. phosphatase 117 10/16/2021 06:00 AM    Bilirubin, total 0.7 10/16/2021 06:00 AM       Imaging Last 24 Hours:  No results found.   Assessment//Plan   Active Problems:    Psychosis ([address] (10/14/2021)      Assessment & Plan  No changes in medication  Continue current meds and therapy  No side effects voiced  Psychoeducation and support provided  Addressed her concerns and stressors      Current Facility-Administered Medications:     lurasidone (LATUDA) tablet 20 mg, 20 mg, Oral, DAILY WITH DINNER, Matt Bocanegra MD, 20 mg at 10/16/21 1745    OLANZapine (ZyPREXA) tablet 5 mg, 5 mg, Oral, Q6H PRN, David Sainz MD    haloperidol lactate (HALDOL) injection 5 mg, 5 mg, IntraMUSCular, Q6H PRN, Dennis Moya MD    benztropine (COGENTIN) tablet 1 mg, 1 mg, Oral, BID PRN, David Sainz MD    diphenhydrAMINE (BENADRYL) injection 50 mg, 50 mg, IntraMUSCular, BID PRN, David Bocanegra MD    hydrOXYzine HCL (ATARAX) tablet 50 mg, 50 mg, Oral, TID PRN, Dennis Moya MD, 50 mg at 10/16/21 2126    LORazepam (ATIVAN) injection 1 mg, 1 mg, IntraMUSCular, Q4H PRN, Dennis Moya MD    traZODone (DESYREL) tablet 50 mg, 50 mg, Oral, QHS PRN, Dennis Moya MD    acetaminophen (TYLENOL) tablet 650 mg, 650 mg, Oral, Q4H PRN, David Sainz MD    magnesium hydroxide (MILK OF MAGNESIA) 400 mg/5 mL oral suspension 30 mL, 30 mL, Oral, DAILY PRN, Matt Sainz MD    OLANZapine (ZyPREXA) tablet 5 mg, 5 mg, Oral, Q6H PRN, Matt Saizn MD    haloperidol lactate (HALDOL) injection 5 mg, 5 mg, IntraMUSCular, Q6H PRN, Dennis Moya MD    benztropine (COGENTIN) tablet 1 mg, 1 mg, Oral, BID PRN, Dennis Moya MD    diphenhydrAMINE (BENADRYL) injection 50 mg, 50 mg, IntraMUSCular, BID PRN, Matt Sainz MD    LORazepam (ATIVAN) injection 1 mg, 1 mg, IntraMUSCular, Q4H PRN, Dennis Moya MD    traZODone (DESYREL) tablet 50 mg, 50 mg, Oral, QHS PRN, Dawn Garza MD, 50 mg at 10/16/21 2126    acetaminophen (TYLENOL) tablet 650 mg, 650 mg, Oral, Q4H PRN, Bre MITTAL MD, 650 mg at 10/14/21 1052    magnesium hydroxide (MILK OF MAGNESIA) 400 mg/5 mL oral suspension 30 mL, 30 mL, Oral, DAILY PRN, Matt Sainz MD    hydrOXYzine HCL (ATARAX) tablet 25 mg, 25 mg, Oral, TID PRN, Dawn Garza MD, 25 mg at 10/17/21 1251    influenza vaccine 2021-22 (6 mos+)(PF) (FLUARIX/FLULAVAL/FLUZONE QUAD) injection 0.5 mL, 1 Each, IntraMUSCular, PRIOR TO DISCHARGE, Dawn Garza MD  Electronically signed by Saulo Rome MD on 10/17/2021 at 2:05 PM

## 2021-10-17 NOTE — BH NOTES
[facility] Shift Note:    Ms. Varsha Hernandez was alert and oriented times 4 at the onset of the shift. She presented pleasant calm and well kempt. She denied thoughts to harm herself or others. She endorsed visual hallucinations but did not elaborate and stated that they were minimizing. She requested medications to aid in her sleeping and anxiety. She rated her anxiety 8 out of 10 and denied depression. She is currently in bed with her eyes closed with even and unlabored breathing. Staff will continue to monitor on every 15 minute checks.

## 2021-10-17 NOTE — PROGRESS NOTES
Progress Note  Date:10/16/2021       Room:[facility]  Patient Name:Martha Morales     YOB: 1990     Age:31 y.o. Subjective    Subjective   Patient 81-year-old -American female reports feeling slightly better with her anxiety depression and her thought process. She reports struggling with her feelings and fear of going to sleep. She often states she cannot explain her feelings that she goes through. She also feels that somebody is touching uncomfortable feeling with her body. She denies having any active plan of suicide or homicide she denies having any auditory hallucinations or visual hallucinations. Mental status examination-patient is alert oriented to time place person. Patient presents difficulty expressing certain thoughts but reports feeling better than the day she came in she has having increasing insight. She denies any SI HI she feels better with her sleep she still continues to struggle with her feelings relating to her body and fear. Insight judgment coping remains limited  Review of Systems  Objective         Vitals Last 24 Hours:  TEMPERATURE:  Temp  Av.5 °F (36.9 °C)  Min: 97.9 °F (36.6 °C)  Max: 99.1 °F (37.3 °C)  RESPIRATIONS RANGE: Resp  Av  Min: 20  Max: 22  PULSE OXIMETRY RANGE: SpO2  Av %  Min: 98 %  Max: 98 %  PULSE RANGE: Pulse  Av.5  Min: 63  Max: 106  BLOOD PRESSURE RANGE: Systolic (28PEW), MZE:530 , Min:105 , JYS:226   ; Diastolic (35NBA), DKE:26, Min:75, Max:82    I/O (24Hr):   No intake or output data in the 24 hours ending 10/16/21 2110  Objective  Labs/Imaging/Diagnostics    Labs:  CBC:Recent Labs     10/16/21  0600 10/14/21  0225   WBC 9.5 11.0   RBC 4.96 5.05   HGB 13.9 14.1   HCT 42.1 43.1   MCV 84.9 85.3   RDW 12.5 12.4    418*     CHEMISTRIES:  Recent Labs     10/16/21  0600 10/14/21  0225    136   K 3.8 3.5    103   CO2 27 27   BUN 7 4*   CA 9.2 9.2   PT/INR:No results for input(s): INR, INREXT in the last 72 hours. No lab exists for component: PROTIME  APTT:No results for input(s): APTT in the last 72 hours. LIVER PROFILE:  Recent Labs     10/16/21  0600   AST 70*   *     Lab Results   Component Value Date/Time    ALT (SGPT) 125 (H) 10/16/2021 06:00 AM    AST (SGOT) 70 (H) 10/16/2021 06:00 AM    Alk. phosphatase 117 10/16/2021 06:00 AM    Bilirubin, total 0.7 10/16/2021 06:00 AM       Imaging Last 24 Hours:  No results found.   Assessment//Plan   Active Problems:    Psychosis ([address] (10/14/2021)      Assessment & Plan  No dystonic symptoms no side effects noted  Continue current medications  Provided support psychoeducation    Current Facility-Administered Medications:     lurasidone (LATUDA) tablet 20 mg, 20 mg, Oral, DAILY WITH DINNER, Matt Goel MD, 20 mg at 10/16/21 1745    OLANZapine (ZyPREXA) tablet 5 mg, 5 mg, Oral, Q6H PRN, Machelle Sainz MD    haloperidol lactate (HALDOL) injection 5 mg, 5 mg, IntraMUSCular, Q6H PRN, Murray Vernon MD    benztropine (COGENTIN) tablet 1 mg, 1 mg, Oral, BID PRN, Machelle Sainz MD    diphenhydrAMINE (BENADRYL) injection 50 mg, 50 mg, IntraMUSCular, BID PRN, Machelle Goel MD    hydrOXYzine HCL (ATARAX) tablet 50 mg, 50 mg, Oral, TID PRN, Murray Vernon MD, 50 mg at 10/15/21 2103    LORazepam (ATIVAN) injection 1 mg, 1 mg, IntraMUSCular, Q4H PRN, Murray Vernon MD    traZODone (DESYREL) tablet 50 mg, 50 mg, Oral, QHS PRN, Murray Vernon MD    acetaminophen (TYLENOL) tablet 650 mg, 650 mg, Oral, Q4H PRN, Machelle Sainz MD    magnesium hydroxide (MILK OF MAGNESIA) 400 mg/5 mL oral suspension 30 mL, 30 mL, Oral, DAILY PRN, Matt Sainz MD    OLANZapine (ZyPREXA) tablet 5 mg, 5 mg, Oral, Q6H PRN, Matt Sainz MD    haloperidol lactate (HALDOL) injection 5 mg, 5 mg, IntraMUSCular, Q6H PRN, Matt Sainz MD    benztropine (COGENTIN) tablet 1 mg, 1 mg, Oral, BID PRN, MD Gabriela Canales Sicard diphenhydrAMINE (BENADRYL) injection 50 mg, 50 mg, IntraMUSCular, BID PRN, Matt Sainz MD    LORazepam (ATIVAN) injection 1 mg, 1 mg, IntraMUSCular, Q4H PRN, Reba Avery MD    traZODone (DESYREL) tablet 50 mg, 50 mg, Oral, QHS PRN, Reba Avery MD, 50 mg at 10/15/21 2103    acetaminophen (TYLENOL) tablet 650 mg, 650 mg, Oral, Q4H PRN, Reba Avery MD, 650 mg at 10/14/21 1052    magnesium hydroxide (MILK OF MAGNESIA) 400 mg/5 mL oral suspension 30 mL, 30 mL, Oral, DAILY PRN, Matt Sainz MD    hydrOXYzine HCL (ATARAX) tablet 25 mg, 25 mg, Oral, TID PRN, Reba Avery MD, 25 mg at 10/16/21 1248    influenza vaccine 2021-22 (6 mos+)(PF) (FLUARIX/FLULAVAL/FLUZONE QUAD) injection 0.5 mL, 1 Each, IntraMUSCular, PRIOR TO DISCHARGE, Reba Avery MD  Electronically signed by Orquidea Koenig MD on 10/16/2021 at 9:10 PM

## 2021-10-17 NOTE — BH NOTES
Pt.is up and visible on unit,affect is flat, appetite good, appearance is neat,denies feeling suicidal or depressed, states she feels like the medication is working ,the voices are lessening, reports a little less anxiety. Pt.is attending groups, no other concerns voiced, remains on close observation.

## 2021-10-17 NOTE — GROUP NOTE
IP  GROUP DOCUMENTATION INDIVIDUAL                                                                          Group Therapy Note    Date: 10/17/2021    Group Start Time: 1520  Group End Time: 4515  Group Topic: Reflection/Relaxation    [facility] 2 BH NON ACUTE    Tiff Cart    IP 1150 [facility] GROUP DOCUMENTATION GROUP    Group Therapy Note    Attendees: 8/13  Facilitated structured group to introduce positive way to cope and manage daily stressors. Attendance: Attended    Patient's Goal:  STG: Attends activities and groups        Interventions/techniques: Art integration and Supported    Follows Directions: Followed directions    Interactions: Interacted appropriately    Mental Status: Calm    Behavior/appearance: Attentive    Goals Achieved: Able to engage in interactions and Able to listen to others      Additional Notes: Attended group and actively participated in group. Was receptive to intervention.  Was interactive with staff/peers  LUTHER Hunter

## 2021-10-18 PROCEDURE — 74011250637 HC RX REV CODE- 250/637: Performed by: PSYCHIATRY & NEUROLOGY

## 2021-10-18 PROCEDURE — 65220000003 HC RM SEMIPRIVATE PSYCH

## 2021-10-18 RX ADMIN — TRAZODONE HYDROCHLORIDE 50 MG: 50 TABLET ORAL at 20:23

## 2021-10-18 RX ADMIN — HYDROXYZINE HYDROCHLORIDE 50 MG: 50 TABLET, FILM COATED ORAL at 13:43

## 2021-10-18 RX ADMIN — HYDROXYZINE HYDROCHLORIDE 50 MG: 50 TABLET, FILM COATED ORAL at 20:23

## 2021-10-18 RX ADMIN — OLANZAPINE 5 MG: 5 TABLET, FILM COATED ORAL at 20:24

## 2021-10-18 RX ADMIN — LURASIDONE HYDROCHLORIDE 20 MG: 20 TABLET, FILM COATED ORAL at 17:09

## 2021-10-18 NOTE — GROUP NOTE
IP  GROUP DOCUMENTATION INDIVIDUAL                                                                          Group Therapy Note    Date: 10/18/2021    Group Start Time: 8247  Group End Time: 1400  Group Topic: Process Group - Inpatient    [facility] CARE MANAGEMENT    Teresa Adkins BSW    [facility] GROUP DOCUMENTATION GROUP    Group Therapy Note    The facilitator prompted the pts to provide feedback to one another through activities and discussion. Attendees: 6/13          Attendance: Attended    Patient's Goal:  Attend group     Interventions/techniques: Promoted peer support and Provide feedback    Follows Directions: Followed directions    Interactions: Interacted appropriately    Mental Status: Calm, Flat, Manic and Preoccupied    Behavior/appearance: Cooperative, Disheveled, Needed prompting and Withdrawn/quiet    Goals Achieved: Able to engage in interactions, Able to listen to others, Able to give feedback to another, Able to receive feedback and Able to self-disclose      Additional Notes: The pt discussed how she shadi with disappointment and that although things may not always go to plan that she remains positive and surrounds herself with people that will lift her up.      SLOANE Gongora

## 2021-10-18 NOTE — BH NOTES
DAYSHIFT NOTE:     Patient is walking around the unit and in the dayroom interacting with peers and walking around on the unit. Patient is pleasant on approach and smiling. Patient stated that she \"was feeling better now and she felt like it was time to start thinking about and planning for her discharge plans and how if something like this happened again or if she did not feel safe she knew where to come. \" Patient discussed how she knew she needed to continue to take her medications and how they were helping her feel better. Patient stated that her paranoia was better and the only reason she had paranoia was because of what she was going through. Patient denies having any depression and stated that she never felt depressed. Denies anxiety this morning however came to the writer before quiet time and stated \"can I get my anxiety medication before quiet time so I can lay down and take a nap? \" Patient was given a PRN Atarax around 1343 per request for anxiety. Denies SI/HI. Patient takes her medications in the evening time. Patient is up for her meals. Patient is pleasant on approach and stated that she \"talked with the doctor about feeling ready to work towards discharge and he is suppose to bring her case up in treatment team tomorrow. \" Close observations continued to ensure patient safety.

## 2021-10-18 NOTE — BH NOTES
Patient came up to the nurses station and was tearful and asked if this writer could speak with her in the office. Patient started off by explaining why she was here in the hospital and how she thought she was doing better but described \"how there are spirits everywhere and they enter her body. \" Patient discussed how it was challenging and she has no feelings to harm herself and she knows she needs to continue to take her medications and to go to therapy, but was questioning if this writer thought it was safe for her to discharge with these continued feelings. Patient was educated that maybe she should not discharge so soon, but educated that it sounded like this may be a lifelong thing that she was going to have to learn how to work through and deal with. Patient was encouraged to continue with her medications and to definitely continue with therapy and try to seek out someone who specializes in her case. Patient voiced concern and worry and stated \"there are spirits everywhere, but there are millions and millions outside. \" Patient voiced how they distract her and bother her and there are several different spirits that enter her body and some leave. Patient stated that with the medications she does not \"feel the presence of them like she did with the weight on her chest, but she still knows they are there. \" Patient was encouraged to try to keep her mind occupied and busy during these times and to continue to be strong and continuing to push through these spirits. Patient was given her evening latuda with dinner and was educated the earliest that she could receive her bedtime medications was 8pm. Patient wanted her bedtime medications early so she could go to sleep. Will continue to monitor.

## 2021-10-18 NOTE — GROUP NOTE
IP  GROUP DOCUMENTATION INDIVIDUAL                                                                          Group Therapy Note    Date: 10/18/2021    Group Start Time: 1130  Group End Time: 4564  Group Topic: Education Group - Inpatient    [facility] 2  NON ACUTE    Brett Hurley    [facility] GROUP DOCUMENTATION GROUP    Group Therapy Note    Facilitated discussion focused on being aware of different feelings and their triggers and changes that need to be made to experience more comfortable feelings and less uncomfortable feelings    Attendees: 6/13         Attendance: Attended    Patient's Goal:  Attend group daily     Interventions/techniques: Informed and Supported    Follows Directions: Followed directions    Interactions: Interacted appropriately    Mental Status: Calm    Behavior/appearance: Cooperative    Goals Achieved: Able to engage in interactions, Able to listen to others, Able to self-disclose, Discussed coping, Identified feelings and Identified triggers      Additional Notes:  Receptive to information and engaged. Pt was able to identify different feelings she has experienced and its triggers.  Pt shared she was feeling \"distressed \" prior to admission and currently feeling \"positive\"Pt shared in order to feel more comfortable feelings she need to [place] her environment, get her own apartment, be with her family and continue to think positive\"       Jona Burk

## 2021-10-18 NOTE — GROUP NOTE
[facility] GROUP DOCUMENTATION INDIVIDUAL                                                                          Group Therapy Note    Date: 10/18/2021    Group Start Time: 1500  Group End Time: 1600  Group Topic: Reflection/Relaxation    [facility]    Sadia Willard    [facility] GROUP DOCUMENTATION GROUP    Group Therapy Note    Attendees: Patients encouraged to attend relaxation music group. Patients encouraged to pick songs that would help improve their mood. Patients encouraged to listen and relax. Patients also encouraged to be polite and respectful to each other. Attendance: Attended    Patient's Goal:  Attends activities and groups    Interventions/techniques: Validated, Promoted peer support, Reinforced and Supported    Follows Directions: Followed directions    Interactions: Interacted appropriately    Mental Status: Calm and Congruent    Behavior/appearance: Attentive, Caretaking, Cooperative, Enthusiastic and Motivated    Goals Achieved: Able to engage in interactions, Able to listen to others, Able to give feedback to another, Able to self-disclose and Discussed coping      Additional Notes:  Patient attended group and was engaged. Pt able to select multiple songs to enjoy. Pt reported that she was enjoying \"just chilling\" in group.     Yared Granados

## 2021-10-18 NOTE — PROGRESS NOTES
Problem: Falls - Risk of  Goal: *Absence of Falls  Description: Document Mir Carbajal Fall Risk and appropriate interventions in the flowsheet. Outcome: Progressing Towards Goal  Note: Fall Risk Interventions:            Medication Interventions: Teach patient to arise slowly         Patient has been free from falls during this admission.

## 2021-10-18 NOTE — BH NOTES
Behavioral Health Treatment Team Note     Patient goal(s) for today: Prepare for discharge  Treatment team focus/goals: Medication management, symptom reduction    Progress note: Pt was sitting in the day room. She was well groomed and wore clean street attire. She was polite and willing to complete interview with writer. Pt denies any SI/HI and AVH. Pt presented with a broad affect and congruent mood. She has good insight about her mental health and reports that she has not been having any unexplained spiritual experiences within the past 24 hours. Pt also reports that she feels safe being in the presence of other people because when she is at home no one is around to help when she has a supernatural experience. Pt also reports that she is making piece with all the people she may have had bad relationships with in the past. An in patient level of care needed for medication management and symptom reduction and group therapy. LOS:  4  Expected LOS: 5-7    Insurance info/prescription coverage:  Medicaid  Date of last family contact:  Unknown   Family requesting physician contact today:  no  Discharge plan:  Pt will work on options with   Greg in the home:  no   Outpatient provider(s): Will be coordinated upon discharge    Participating treatment team members: Arturo Finley, * (assigned SW intern), Abril Goodwin.  Maru Mckeon

## 2021-10-18 NOTE — GROUP NOTE
[facility] GROUP DOCUMENTATION INDIVIDUAL                                                                          Group Therapy Note    Date: 10/18/2021    Group Start Time: 0900  Group End Time: 0915  Group Topic: [employer]    SR[facility] 2 87 Stephania Esquivel RN    IP 1150 [facility] GROUP DOCUMENTATION GROUP    Group Therapy Note    Attendees:        Attendance: Attended    Patient's Goal:  \"To talk to someone about d/c.\"      Interventions/techniques: Supported    Follows Directions: Followed directions    Interactions: Interacted appropriately    Mental Status: Calm    Behavior/appearance: Attentive    Goals Achieved: Able to engage in interactions      Additional Notes:  Goals Group. Denied feeling depressed andor suicidal. Rated her anxiety level 2/10.      John Massey RN

## 2021-10-19 PROCEDURE — 65220000003 HC RM SEMIPRIVATE PSYCH

## 2021-10-19 PROCEDURE — 74011250637 HC RX REV CODE- 250/637: Performed by: PSYCHIATRY & NEUROLOGY

## 2021-10-19 RX ADMIN — OLANZAPINE 5 MG: 5 TABLET, FILM COATED ORAL at 20:24

## 2021-10-19 RX ADMIN — HYDROXYZINE HYDROCHLORIDE 50 MG: 50 TABLET, FILM COATED ORAL at 20:24

## 2021-10-19 RX ADMIN — HYDROXYZINE HYDROCHLORIDE 50 MG: 50 TABLET, FILM COATED ORAL at 12:30

## 2021-10-19 RX ADMIN — TRAZODONE HYDROCHLORIDE 50 MG: 50 TABLET ORAL at 20:24

## 2021-10-19 RX ADMIN — LURASIDONE HYDROCHLORIDE 20 MG: 20 TABLET, FILM COATED ORAL at 17:32

## 2021-10-19 NOTE — BH NOTES
Behavioral Health Treatment Team Note     Patient goal(s) for today: Control symptoms  Treatment team focus/goals: Medication management, symptom reduction and group therapy    Progress note: Pt was sitting in the day room coloring. She was polite and spoke in a soft tone and medium pace. Pt was well groomed and wearing clean street attire. Pt presented with a broad affect and calm mood. Pt denies any SI/HI and AVH even though she endorses there being spirits around her. Pt stated that she knows they are not real and they can do no harm to her. She stated that these are spirits who have camped around her pretending to be her mother and grandmother. Pt described her experience as being empathic and not real, but being something that helps her being in tuned with spiritual beings that others are not aware of. Continued level of in patient care needed for symptom reduction and medication management. LOS:  5  Expected LOS: 5-7    Insurance info/prescription coverage:  Medicaid  Date of last family contact:  Unknown   Family requesting physician contact today:  no  Discharge plan: Pt will work on options with   Greg in the home: no   Outpatient provider(s): Will be coordinated upon discharge    Participating treatment team members: Mikala Guardado, * (assigned SW intern), Jodi Murillo.  Oralia Silverio

## 2021-10-19 NOTE — GROUP NOTE
IP  GROUP DOCUMENTATION INDIVIDUAL                                                                          Group Therapy Note    Date: 10/19/2021    Group Start Time: 1120  Group End Time: 1200  Group Topic: Education Group - Inpatient    [facility] 2  NON ACUTE    Juan Kearney    [facility] GROUP DOCUMENTATION GROUP    Group Therapy Note    Facilitated discussion focused on defining different types of defense mechanisms and being able to recognize some defenses that was used to cope with stress and anxiety    Attendees: 5/13         Attendance: Attended    Patient's Goal:  Attend group daily     Interventions/techniques: Informed and Supported    Follows Directions:  Followed directions    Interactions: Interacted appropriately    Mental Status: Calm    Behavior/appearance: Cooperative    Goals Achieved: Able to engage in interactions, Able to listen to others and Able to self-disclose      Additional Notes:  Receptive to information discussed and was able to share personal example of defenses she has used such as \"displacement and denial\"    Maricruz Hdz, [address]

## 2021-10-19 NOTE — BH NOTES
Patient alert and oriented. Patient isolative to her room. Patient denies SI/HI but endorses that he demons are possessing her. Patient states, \" The demons are not in my body or in my mind but they feel things through me like when I eat. They cause me discomfort like when someone says something very personal to you and it stabs you. Some of them go away because they know how uncomfortable I am and some stay and think that I am their permanent residence. When I sleep I here them talking to each other inside me. \" Patient was given prn medications to help with voices and sleep and patient was compliant with medication. Patient contracted for safety.

## 2021-10-19 NOTE — PROGRESS NOTES
Problem: Suicide  Goal: *STG: Remains safe in hospital  Outcome: Progressing Towards Goal     Patient has remained safe in the hospital during this admission.

## 2021-10-19 NOTE — BH NOTES
Patient has been participating in some groups and interacting appropriately. Patient requested PRN for anxiety which was given. Patient met with SW today. Ate meals.   Patient is haring voices, denies SI/HI

## 2021-10-19 NOTE — GROUP NOTE
[facility] GROUP DOCUMENTATION INDIVIDUAL                                                                          Group Therapy Note    Date: 10/19/2021    Group Start Time: 6056  Group End Time: 3081  Group Topic: Process Group - Inpatient    [facility] CARE MANAGEMENT    Teresa Adkins BSW    [facility] GROUP DOCUMENTATION GROUP    Group Therapy Note    The facilitator encouraged the group to provide feedback and process their day. Attendees: 2/12         Attendance: Attended    Patient's Goal:  Attend group     Interventions/techniques: Promoted peer support and Supported    Follows Directions: Followed directions    Interactions: Interacted appropriately    Mental Status: Congruent    Behavior/appearance: Cooperative    Goals Achieved: Able to engage in interactions, Able to listen to others, Able to give feedback to another, Able to reflect/comment on own behavior, Able to manage/cope with feelings, Able to receive feedback and Able to self-disclose      Additional Notes: The pt talked about being subjected to spiritual attacks after using tarot cards and that she has had demons following and talking to her. The pt has requested to speak with the chaplian and hasn't heard from him so the writer assured her that she would contact him. Pt also talked about the importance of realizing that people may not always apologize when they're wrong.      SLOANE Diaz

## 2021-10-19 NOTE — GROUP NOTE
[facility] GROUP DOCUMENTATION INDIVIDUAL                                                                          Group Therapy Note    Date: 10/19/2021    Group Start Time: 1500  Group End Time: 1600  Group Topic: Reflection/Relaxation    [facility]    Sadia Willard    [facility] GROUP DOCUMENTATION GROUP    Group Therapy Note    Attendees: Patients encouraged to attend group and listen to music to help relax and enjoy their time. Patients asked to pick songs which wer emade into a playlist to listen to. Patients encouraged to be polite and respectful to peers. Attendance: Attended    Patient's Goal:  Attends activities and groups    Interventions/techniques: Validated, Promoted peer support, Reinforced and Supported    Follows Directions: Followed directions    Interactions: Interacted appropriately    Mental Status: Calm and Congruent    Behavior/appearance: Attentive and Cooperative    Goals Achieved: Able to engage in interactions and Able to listen to others      Additional Notes:  Pt attended group and was engaged. Pt was able to select a song to listen to and listened appropriately.     Jessica Nava

## 2021-10-20 PROCEDURE — 74011250637 HC RX REV CODE- 250/637: Performed by: PSYCHIATRY & NEUROLOGY

## 2021-10-20 PROCEDURE — 65220000003 HC RM SEMIPRIVATE PSYCH

## 2021-10-20 RX ADMIN — OLANZAPINE 5 MG: 5 TABLET, FILM COATED ORAL at 21:12

## 2021-10-20 RX ADMIN — TRAZODONE HYDROCHLORIDE 50 MG: 50 TABLET ORAL at 21:12

## 2021-10-20 RX ADMIN — HYDROXYZINE HYDROCHLORIDE 50 MG: 50 TABLET, FILM COATED ORAL at 17:44

## 2021-10-20 NOTE — BH NOTES
Behavioral Health Treatment Team Note     Patient goal(s) for today: 'get ready to go home'  Treatment team focus/goals: continue medication management, group therapy and coordinate outpatient services    Progress note: Pt presented with a broad affect and congruent mood. Pt denied SI/HI/AVH and depression and anxiety. Pt reported that she is 'feeling much better' and feels that the medicine is helping a lot. Pt also shared that she got to speak with the gavin today and is happy she was able to do that. Pt reported that she wants to be connected to services such as outpatient therapy, mobile crisis and psychiatry. Pt said that the groups have also helped her.  Pt still needs an inpatient level of care due to needing a safe discharge plan    LOS:  6  Expected LOS: 7-10 days     Insurance info/prescription coverage:  Medicaid  Date of last family contact:  n/a  Family requesting physician contact today:  no  Discharge plan:  Pt will return home with outpatient services   Guns in the home:  no   Outpatient provider(s):  None at this time, will be coordinated prior to discharge     Participating treatment team members: Maritza Raya, * (assigned SW), DARI Guzman

## 2021-10-20 NOTE — BH NOTES
Progress note for October 20, 2021 patient case discussed in the treatment team patient is progressing and and better night no bothering of entities but want to make sure that she call back she is stable and do not have a reoccurrence of entities. And I went ahead and increase her Latuda from 20 mg to 40 mg at night her vital signs are stable labs reviewed no new labs resulted she is concerned about when she go back she will have a job or not apparently prior to coming here she did email to her employer about state will be willing to help her with the necessary [place] paperwork that she can take back when the time comes. Again I have increased her Latuda to 40 mg from the beginning dose of 20 mg.   Continued inpatient level of care indicated not suicidal not homicidal no auditory visual hallucinations but she felt the medication helping but she want to make sure she was consistently doing well when she returns to work thank you

## 2021-10-20 NOTE — GROUP NOTE
IP  GROUP DOCUMENTATION INDIVIDUAL                                                                          Group Therapy Note    Date: 10/20/2021    Group Start Time: 3421  Group End Time: 1400  Group Topic: Process Group - Inpatient    [facility] CARE MANAGEMENT    Merrill Spine    IP 1150 [facility] GROUP DOCUMENTATION GROUP    Group Therapy Note  Patients were encouraged to discuss reasons for coming to the hospital and growth they have experienced since being admitted. Pts discussed discharge plans and group members discussed goals post discharge and skills required to accomplish those goals. Attendees:4/14         Attendance: Attended    Patient's Goal:  Pt responded to check in question and stated that she is preparing to discharge tomorrow. Interventions/techniques: Informed, Validated, Promoted peer support, Provide feedback and Supported    Follows Directions: Followed directions    Interactions: Interacted appropriately    Mental Status: Calm and Congruent    Behavior/appearance: Attentive, Caretaking and Cooperative    Goals Achieved: Able to engage in interactions, Able to listen to others, Able to give feedback to another, Able to reflect/comment on own behavior, Able to receive feedback and Able to self-disclose      Additional Notes:  Pt presented with calm affect and congruent mood. Pt reported she has learned to be patient, and pt was able to self disclose to the group. Pt presented with good insight. Pt was polite and respectful.      Tomma Posey

## 2021-10-20 NOTE — GROUP NOTE
[facility]  GROUP DOCUMENTATION INDIVIDUAL                                                                          Group Therapy Note    Date: 10/20/2021    Group Start Time: 9859  Group End Time: 8317  Group Topic: Nursing    [address], RN    IP 1150 [facility] GROUP DOCUMENTATION GROUP    Group Therapy Note    Attendees: 3         Attendance: Attended    Patient's Goal:  To stay in the present    Interventions/techniques: Reinforced    Follows Directions: Followed directions    Interactions: Interacted appropriately    Mental Status: Calm    Behavior/appearance: Attentive    Goals Achieved: Able to listen to others      Additional Notes:  Patient States that she is taking her medication as ordered. She has set up a timer to remind her of her medication. Feels she had made progress.     Prince Polk RN

## 2021-10-20 NOTE — PROGRESS NOTES
Spiritual Care Assessment/Progress Note  [facility]      NAME: Adrien Burgos      MRN: [id number]  AGE: 28 y.o.  SEX: female  Congregational Affiliation: No preference   Language: English     10/20/2021     Total Time (in minutes): 30     Spiritual Assessment begun in [facility] through conversation with:         [x]Patient        [] Family    [] Friend(s)        Reason for Consult: Initial/Spiritual assessment, patient floor, Request by patient     Spiritual beliefs: (Please include comment if needed)     [x] Identifies with a judith tradition: Lutheran ([facility])        [] Supported by a judith community:            [] Claims no spiritual orientation:           [] Seeking spiritual identity:                [] Adheres to an individual form of spirituality:           [] Not able to assess:                           Identified resources for coping:      [x] Prayer                               [] Music                  [] Guided Imagery     [x] Family/friends                 [] Pet visits     [x] Devotional reading                         [] Unknown     [] Other:                                               Interventions offered during this visit: (See comments for more details)    Patient Interventions: Affirmation of emotions/emotional suffering, Affirmation of judith, Catharsis/review of pertinent events in supportive environment, Coping skills reviewed/reinforced, Iconic (affirming the presence of God/Higher Power), Normalization of emotional/spiritual concerns, Prayer (actual)           Plan of Care:     [] Support spiritual and/or cultural needs    [] Support AMD and/or advance care planning process      [] Support grieving process   [] Coordinate Rites and/or Rituals    [] Coordination with community clergy   [] No spiritual needs identified at this time   [] Detailed Plan of Care below (See Comments)  [] Make referral to Music Therapy  [] Make referral to Pet Therapy     [] Make referral to Addiction services  [] Make referral to [facility]  [] Make referral to Spiritual Care Partner  [] No future visits requested        [x] Follow up upon further referrals     Comments:  visit for initial spiritual assessment. Patient requested  visit. Met patient in private office on unit, provided spiritual presence and listening as she spoke of her current thoughts, feelings, and concerns. Good eye contact, calm, friendly. Spoke of her health and summarized the events leading to this hospitalization. Spoke of the recovery and healing she says she has already experienced. Spoke about her judith and the role of her judith in her understanding and role in her coping. Says this experience has been difficult, but has resulted in strengthening her judith and understanding of what she has been lacking spiritually and in her practice of judith. Asked if there were Bible passages that spoke directly to her experience and also spoke of several she had been reminded of. Requested prayer and a prayer was offered. She appeared comforted and encouraged as a result of this prayer and visit and expressed gratitude for this prayer and visit. Rev.  Katherine Jerez, Yan[place], [place], [facility]   paging service: [phone])

## 2021-10-20 NOTE — BH NOTES
Patient visible in dayroom sitting; interacts with staff and peers. Denies suicidal ideation or homicidal ideation. Denies hallucinations. Denies anxiety or depression. No scheduled medication; however, patient requested medication for sleep- given trazodone, zyprexa, and atarax per physician order.  Will continue to monitor for safety

## 2021-10-20 NOTE — BH NOTES
This is a progress note for October 19, 2021 patient case discussed in the treatment team patient seen for follow-up there is some regression of some poor sleep and some anxiety thought that any of that entities are bothering her. And some anxiety. She wanted to leave stable no suicidal thoughts no 100 alert oriented willing to stay a little bit longer to medication to get in the system and be stable.   Vital signs are stable no new labs resulted no side effect from medications noted continued inpatient level of care indicated

## 2021-10-20 NOTE — BH NOTES
Client is pleasant and cooperative. Alert and oriented x 3. Appearance is neat and clean. She attends all scheduled groups and unit activities. She did meet with the  today and reports that the meeting went well. Denies feeling suicidal or homicidal.Interacts well with select peers. Affect remains flat. She has a good appetite and reports sleeping well. Remains on close observation for safety.

## 2021-10-20 NOTE — GROUP NOTE
KENNETH  GROUP DOCUMENTATION INDIVIDUAL                                                                          Group Therapy Note    Date: 10/20/2021    Group Start Time: 1100  Group End Time: 1200  Group Topic: Process Group - Inpatient    [facility] CARE MANAGEMENT    Bonnye Sever    IP [address] [facility] GROUP DOCUMENTATION GROUP    Group Therapy Note  Patients were encouraged to introduce themselves and tell a little bit about why they were here. Pt's were supported in a compassionate manor as they discussed their shared experiences. Pt also challenged to develop new coping skill to manage their anxiety symptoms. .   Attendees: 8/15         Attendance: Attended    Patient's Goal:  Managing symptoms    Interventions/techniques: Challenged, Informed, Validated, Promoted peer support, Provide feedback, Reinforced, Role playing and Supported    Follows Directions: Followed directions    Interactions: Interacted appropriately    Mental Status: Calm    Behavior/appearance: Attentive, Caretaking, Cooperative, Enthusiastic and Motivated    Goals Achieved: Able to listen to others, Able to give feedback to another, Able to reflect/comment on own behavior, Able to manage/cope with feelings, Able to receive feedback and Displayed empathy      Additional Notes:  Pt was very attentive to the needs of the other attendees and offered insightful information.      Nora Nagy

## 2021-10-21 VITALS
BODY MASS INDEX: 45.99 KG/M2 | HEIGHT: 67 IN | DIASTOLIC BLOOD PRESSURE: 73 MMHG | RESPIRATION RATE: 18 BRPM | OXYGEN SATURATION: 97 % | TEMPERATURE: 98.5 F | SYSTOLIC BLOOD PRESSURE: 106 MMHG | HEART RATE: 84 BPM | WEIGHT: 293 LBS

## 2021-10-21 PROCEDURE — 74011250637 HC RX REV CODE- 250/637: Performed by: PSYCHIATRY & NEUROLOGY

## 2021-10-21 RX ORDER — TRAZODONE HYDROCHLORIDE 50 MG/1
50 TABLET ORAL
Qty: 30 TABLET | Refills: 0 | Status: SHIPPED | OUTPATIENT
Start: 2021-10-21

## 2021-10-21 RX ORDER — HYDROXYZINE PAMOATE 25 MG/1
25 CAPSULE ORAL
Qty: 90 CAPSULE | Refills: 0 | Status: SHIPPED | OUTPATIENT
Start: 2021-10-21

## 2021-10-21 RX ADMIN — HYDROXYZINE HYDROCHLORIDE 50 MG: 50 TABLET, FILM COATED ORAL at 12:36

## 2021-10-21 NOTE — BH NOTES
DISCHARGE SUMMARY    NAME:Martha Morales  : 1990  MRN: [id number]    The patient Maxx Fallon exhibits the ability to control behavior in a less restrictive environment. Patient's level of functioning is improving. No assaultive/destructive behavior has been observed for the past 24 hours. No suicidal/homicidal threat or behavior has been observed for the past 24 hours. There is no evidence of serious medication side effects. Patient has not been in physical or protective restraints for at least the past 24 hours. If weapons involved, how are they secured? n/a    Is patient aware of and in agreement with discharge plan? yes    Arrangements for medication:  Prescriptions given to patient, given a weeks supply or 30 day supply. Copy of discharge instructions to provider?:  Yes     Arrangements for transportation home:  Pt will take a taxi home    Keep all follow up appointments as scheduled, continue to take prescribed medications per physician instructions.   Mental health crisis number:  529 or your local mental health crisis line number at [place] [address] Emergency WARM LINE      4-413-757-MHAV ([phone])      M-F: 9am to 9pm      Sat & Sun: 5pm  9pm  National suicide prevention lines:                             6-041-USMKHXJ (3-927-264-9934)       7-736-001-TALK ([phone])    Crisis Text Line:  Text HOME to 867926

## 2021-10-21 NOTE — BH NOTES
Patient visible in dayroom watching tv. Denies suicidal ideation or thoughts of self harm. Denies homicidal ideation. Denies hallucinations. Denies anxiety or depression. Medication compliant.  Will continue to monitor for safety

## 2021-10-21 NOTE — BH NOTES
Dx: Psychosis    Affect restricted; however, pt is pleasant, and denies having thoughts to self harm. Pt was started on Latuda, last evening; denied problems. Pt is currently not on any meds, during this shift. Consumed 3/4 of bfkt, in the dayroom, with others; observed interacting appropriately. Showered and changed her clothes. Encouraged to cont to attend groups. Q 15 mins checks maintained, for safety.

## 2021-10-21 NOTE — GROUP NOTE
IP  GROUP DOCUMENTATION INDIVIDUAL                                                                          Group Therapy Note    Date: 10/21/2021    Group Start Time: 7073  Group End Time: 1400  Group Topic: Process Group - Inpatient    [facility] CARE MANAGEMENT    Dee Dee Strauss    IP [address]150 [facility] GROUP DOCUMENTATION GROUP    Group Therapy Note    Attendees: 5/16    Patients were asked how they were feeling as a check-in question. The writer then facilitated an open discussion about anger and the importance of learning how to manage it. The writer then encouraged pt's to participate in a n activity that consisted of rolling a ball to one another that had different probing questions related to anger management and the utilization of coping skills and self-awareness         Attendance: Attended    Patient's Goal:  Pt responded to the check-in that she was feeling good    Interventions/techniques: Informed and Validated    Follows Directions: Followed directions    Interactions: Interacted appropriately    Mental Status: Calm and Congruent    Behavior/appearance: Attentive and Cooperative    Goals Achieved: Able to engage in interactions, Able to listen to others, Able to give feedback to another, Able to reflect/comment on own behavior and Able to manage/cope with feelings      Additional Notes:  Pt was attentive during group. Pt was polite and engaged.  Pt was able to self-disclose and identify triggers and feeling surrounding their experience with anger    Kati Telles

## 2021-10-21 NOTE — GROUP NOTE
IP  GROUP DOCUMENTATION INDIVIDUAL                                                                          Group Therapy Note    Date: 10/20/2021    Group Start Time: 1930  Group End Time: 2015  Group Topic: Reflection/Relaxation    [facility] 2  NON ACUTE    Roger Monk    IP [address] [facility] GROUP DOCUMENTATION GROUP    Group Therapy Note    Attendees: 6/15  Facilitated structured group to introduce relaxation technique using Mandalas and instrumentals  to practice relaxation and mindfulness in group         Attendance: Attended    Patient's Goal:STG: Attends activities and groups      Interventions/techniques: Art integration and Supported    Follows Directions: Followed directions    Interactions: Interacted appropriately    Mental Status: Calm    Behavior/appearance: Attentive    Goals Achieved: Able to engage in interactions and Able to listen to others      Additional Notes: Attended group and was receptive to intervention. Received material provided in group and was attentive during group discussion. Participated in relaxation technique of listening to instrumentals and coloring mandalas. Verbalized enjoyment of group.   Attended entire session    Bard Partida

## 2021-10-21 NOTE — SUICIDE SAFETY PLAN
SAFETY PLAN    A suicide Safety Plan is a document that supports someone when they are having thoughts of suicide. Warning Signs that indicate a suicidal crisis may be developing: What (situations, thoughts, feelings, body sensations, behaviors, etc.) do you experience that lets you know you are beginning to think about suicide? 1. Intense distress  2. Chest pain  3. Fear of immediate threat of physical harm    Internal Coping Strategies:  What things can I do (relaxation techniques, hobbies, physical activities, etc.) to take my mind off my problems without contacting another person? 1. Go for a walk  2. Grounding techniques  3. Breathing techniques     People and social settings that provide distraction: Who can I call or where can I go to distract me? 1. Name: Joe Nova  Phone: per pt, number is in phone   2. Name: Nadja Craig  Phone: per pt, number is in phone    3. Place: [facility]. Place: [address] [facility] whom I can ask for help: Who can I call when I need help - for example, friends, family, clergy, someone else? 1. Name: Kalpesh Tang                Phone: per pt, number is in phone  2. Name: Curt Shannon  Phone: per pt, number is in phone   3. Name: [facility]   Phone: per pt, number is in phone     Professionals or [facility] I can contact during a crisis: Who can I call for help - for example, my doctor, my psychiatrist, my psychologist, a mental health provider, a suicide hotline? 1. Clinician Name: [facility]   Phone: [phone]      Clinician Pager or Emergency Contact #: [phone]    8. Clinician Name: Beatrice Chiu   Phone: [phone]      Clinician Pager or Emergency Contact #: [phone]    9. Suicide Prevention Lifeline: 6-986-739-TALK (0200)    4.  [address] [place] [address] Emergency Services -  for example, [facility] suicide hotline, [address] [place] [address] Avenue: [facility]      Emergency Services Address: [address] [place] [address], [address] [facility] Emergency Services Phone: [phone]    Making the environment safe: How can I make my environment (house/apartment/living space) safer? For example, can I remove guns, medications, and other items? 1. Clean up bedroom  2.  Better lightning

## 2021-10-21 NOTE — BH NOTES
Behavioral Health Transition Record to Provider    Patient Name: Monisha Elizabeth  YOB: 1990  Medical Record Number: [id number]  Date of Admission: 10/14/2021  Date of Discharge: 10.21.21    Attending Provider: Gregoria Gomez MD  Discharging Provider: Dr Heide Zhao  To contact this individual call [phone] and ask the  to page. If unavailable, ask to be transferred to [address] [place] [address] Provider on call. [facility] Provider will be available on call 24/7 and during holidays. Primary Care Provider: None    No Known Allergies    Reason for Admission: Pt presented to the ED for AVH and stated that she had been possessed by ky. Admission Diagnosis: Psychosis ([facility] [address] [F29]    * No surgery found *    Results for orders placed or performed during the hospital encounter of 10/14/21   SARS-COV-2   Result Value Ref Range    SARS-CoV-2 Not Detected Not Detected     CBC WITH AUTOMATED DIFF   Result Value Ref Range    WBC 11.0 3.6 - 11.0 K/uL    RBC 5.05 3.80 - 5.20 M/uL    HGB 14.1 11.5 - 16.0 g/dL    HCT 43.1 35.0 - 47.0 %    MCV 85.3 80.0 - 99.0 FL    MCH 27.9 26.0 - 34.0 PG    MCHC 32.7 30.0 - 36.5 g/dL    RDW 12.4 11.5 - 14.5 %    PLATELET 984 (H) 730 - 400 K/uL    MPV 9.4 8.9 - 12.9 FL    NRBC 0.0 0.0  WBC    ABSOLUTE NRBC 0.00 0.00 - 0.01 K/uL    NEUTROPHILS 82 (H) 32 - 75 %    LYMPHOCYTES 13 12 - 49 %    MONOCYTES 5 5 - 13 %    EOSINOPHILS 0 0 - 7 %    BASOPHILS 0 0 - 1 %    IMMATURE GRANULOCYTES 0 0 - 0.5 %    ABS. NEUTROPHILS 9.0 (H) 1.8 - 8.0 K/UL    ABS. LYMPHOCYTES 1.4 0.8 - 3.5 K/UL    ABS. MONOCYTES 0.6 0.0 - 1.0 K/UL    ABS. EOSINOPHILS 0.0 0.0 - 0.4 K/UL    ABS. BASOPHILS 0.0 0.0 - 0.1 K/UL    ABS. IMM.  GRANS. 0.0 0.00 - 0.04 K/UL    DF AUTOMATED     METABOLIC PANEL, BASIC   Result Value Ref Range    Sodium 136 136 - 145 mmol/L    Potassium 3.5 3.5 - 5.1 mmol/L    Chloride 103 97 - 108 mmol/L    CO2 27 21 - 32 mmol/L    Anion gap 6 5 - 15 mmol/L    Glucose 132 (H) 65 - 100 mg/dL    BUN 4 (L) 6 - 20 mg/dL    Creatinine 0.90 0.55 - 1.02 mg/dL    BUN/Creatinine ratio 4 (L) 12 - 20      GFR est AA >60 >60 ml/min/1.73m2    GFR est non-AA >60 >60 ml/min/1.73m2    Calcium 9.2 8.5 - 10.1 mg/dL   URINALYSIS W/ REFLEX CULTURE    Specimen: Urine   Result Value Ref Range    Color Yellow/Straw      Appearance Turbid (A) Clear      Specific gravity <1.005 1.003 - 1.030    pH (UA) 6.0 5.0 - 8.0      Protein Negative Negative mg/dL    Glucose Negative Negative mg/dL    Ketone Negative Negative mg/dL    Bilirubin Negative Negative      Blood Negative Negative      Urobilinogen 0.1 0.1 - 1.0 EU/dL    Nitrites Negative Negative      Leukocyte Esterase Trace (A) Negative      UA:UC IF INDICATED Culture not indicated by UA result Culture not indicated by UA result      WBC 5-10 0 - 4 /hpf    RBC 0-5 0 - 5 /hpf    Bacteria Negative Negative /hpf    Mucus Trace /lpf   DRUG SCREEN, URINE   Result Value Ref Range    AMPHETAMINES Negative Negative      BARBITURATES Negative Negative      BENZODIAZEPINES Negative Negative      COCAINE Negative Negative      METHADONE Negative Negative      OPIATES Negative Negative      PCP(PHENCYCLIDINE) Negative Negative      THC (TH-CANNABINOL) Negative Negative      Drug screen comment        This test is a screen for drugs of abuse in a medical setting only (i.e., they are unconfirmed results and as such must not be used for non-medical purposes, e.g.,employment testing, legal testing). Due to its inherent nature, false positive (FP) and false negative (FN) results may be obtained. Therefore, if necessary for medical care, recommend confirmation of positive findings by GC/MS.    ACETAMINOPHEN   Result Value Ref Range    Acetaminophen level <10 (L) 10 - 30 ug/mL   SALICYLATE   Result Value Ref Range    Salicylate level <1.9 (L) 2.8 - 20.0 mg/dL   HCG URINE, QL   Result Value Ref Range    HCG urine, QL Negative Negative     METABOLIC PANEL, COMPREHENSIVE   Result Value Ref Range    Sodium 138 136 - 145 mmol/L    Potassium 3.8 3.5 - 5.1 mmol/L    Chloride 105 97 - 108 mmol/L    CO2 27 21 - 32 mmol/L    Anion gap 6 5 - 15 mmol/L    Glucose 100 65 - 100 mg/dL    BUN 7 6 - 20 mg/dL    Creatinine 0.79 0.55 - 1.02 mg/dL    BUN/Creatinine ratio 9 (L) 12 - 20      GFR est AA >60 >60 ml/min/1.73m2    GFR est non-AA >60 >60 ml/min/1.73m2    Calcium 9.2 8.5 - 10.1 mg/dL    Bilirubin, total 0.7 0.2 - 1.0 mg/dL    AST (SGOT) 70 (H) 15 - 37 U/L    ALT (SGPT) 125 (H) 12 - 78 U/L    Alk. phosphatase 117 45 - 117 U/L    Protein, total 6.9 6.4 - 8.2 g/dL    Albumin 3.3 (L) 3.5 - 5.0 g/dL    Globulin 3.6 2.0 - 4.0 g/dL    A-G Ratio 0.9 (L) 1.1 - 2.2     TSH 3RD GENERATION   Result Value Ref Range    TSH 2.10 0.36 - 3.74 uIU/mL   LIPID PANEL   Result Value Ref Range    LIPID PROFILE        Cholesterol, total 132 <200 mg/dL    Triglyceride 57 <150 mg/dL    HDL Cholesterol 37 mg/dL    LDL, calculated 83.6 0 - 100 mg/dL    VLDL, calculated 11.4 mg/dL    CHOL/HDL Ratio 3.6 0.0 - 5.0     CBC WITH AUTOMATED DIFF   Result Value Ref Range    WBC 9.5 3.6 - 11.0 K/uL    RBC 4.96 3.80 - 5.20 M/uL    HGB 13.9 11.5 - 16.0 g/dL    HCT 42.1 35.0 - 47.0 %    MCV 84.9 80.0 - 99.0 FL    MCH 28.0 26.0 - 34.0 PG    MCHC 33.0 30.0 - 36.5 g/dL    RDW 12.5 11.5 - 14.5 %    PLATELET 134 818 - 992 K/uL    MPV 9.5 8.9 - 12.9 FL    NRBC 0.0 0.0  WBC    ABSOLUTE NRBC 0.00 0.00 - 0.01 K/uL    NEUTROPHILS 50 32 - 75 %    LYMPHOCYTES 40 12 - 49 %    MONOCYTES 10 5 - 13 %    EOSINOPHILS 0 0 - 7 %    BASOPHILS 0 0 - 1 %    IMMATURE GRANULOCYTES 0 0 - 0.5 %    ABS. NEUTROPHILS 4.6 1.8 - 8.0 K/UL    ABS. LYMPHOCYTES 3.8 (H) 0.8 - 3.5 K/UL    ABS. MONOCYTES 0.9 0.0 - 1.0 K/UL    ABS. EOSINOPHILS 0.0 0.0 - 0.4 K/UL    ABS. BASOPHILS 0.0 0.0 - 0.1 K/UL    ABS. IMM.  GRANS. 0.0 0.00 - 0.04 K/UL    DF AUTOMATED         Immunizations administered during this encounter:   Immunization History   Administered Date(s) Administered  Influenza Vaccine Qordoba) PF (>6 Mo Flulaval, Fluarix, and >3 Yrs [employer]orie [employer] [address]) 10/17/2021       Screening for Metabolic Disorders for Patients on Antipsychotic Medications  (Data obtained from the EMR)    Estimated Body Mass Index  Estimated body mass index is 48.24 kg/m² as calculated from the following:    Height as of this encounter: 5' 7\" (1.702 m). Weight as of this encounter: 139.7 kg (308 lb). Vital Signs/Blood Pressure  Visit Vitals  /73   Pulse 84   Temp 98.5 °F (36.9 °C)   Resp 18   Ht 5' 7\" (1.702 m)   Wt 139.7 kg (308 lb)   SpO2 97%   BMI 48.24 kg/m²       Blood Glucose/Hemoglobin A1c  Lab Results   Component Value Date/Time    Glucose 100 10/16/2021 06:00 AM       No results found for: HBA1C, [id number]     Lipid Panel  Lab Results   Component Value Date/Time    Cholesterol, total 132 10/16/2021 06:00 AM    HDL Cholesterol 37 10/16/2021 06:00 AM    LDL, calculated 83.6 10/16/2021 06:00 AM    Triglyceride 57 10/16/2021 06:00 AM    CHOL/HDL Ratio 3.6 10/16/2021 06:00 AM        Discharge Diagnosis: psychosis     Discharge Plan: Pt is returning home with outpatient services coordinated     Discharge Medication List and Instructions:   Current Discharge Medication List      START taking these medications    Details   lurasidone (LATUDA) 40 mg tab tablet Should be taken with food (at least 350 calories)  Qty: 30 Tablet, Refills: 0  Start date: 10/21/2021      traZODone (DESYREL) 50 mg tablet Take 1 Tablet by mouth nightly as needed for Sleep (For insomnia). Qty: 30 Tablet, Refills: 0  Start date: 10/21/2021         CONTINUE these medications which have CHANGED    Details   hydrOXYzine pamoate (VistariL) 25 mg capsule Take 1 Capsule by mouth three (3) times daily as needed for Anxiety.  Indications: anxious  Qty: 90 Capsule, Refills: 0  Start date: 10/21/2021         STOP taking these medications       buPROPion XL (Wellbutrin XL) 150 mg tablet Comments:   Reason for Stopping: Unresulted Labs (24h ago, onward)    None        To obtain results of studies pending at discharge, please contact [phone]    Follow-up Information     Follow up With Specialties Details Why [address] [facility]  Call they will be contacting you for mental health skill building and mobile crisis  Cory Lázaro Beck  [phone]    [facility]  Go on 11/18/2021 9:00a appt for medication [facility] [address], [address] [place] [address]  [phone]    None    None (395) Patient stated that they have no PCP            Advanced Directive:   Does the patient have an appointed surrogate decision maker? No  Does the patient have a Medical Advance Directive? No  Does the patient have a Psychiatric Advance Directive? No  If the patient does not have a surrogate or Medical Advance Directive AND Psychiatric Advance Directive, the patient was offered information on these advance directives Patient will complete at a later time    Patient Instructions: Please continue all medications until otherwise directed by physician. Tobacco Cessation Discharge Plan:   Is the patient a smoker and needs referral for smoking cessation? Not applicable  Patient referred to the following for smoking cessation with an appointment? Not applicable     Patient was offered medication to assist with smoking cessation at discharge? Not applicable  Was education for smoking cessation added to the discharge instructions? Not applicable    Alcohol/Substance Abuse Discharge Plan:   Does the patient have a history of substance/alcohol abuse and requires a referral for treatment? Not applicable  Patient referred to the following for substance/alcohol abuse treatment with an appointment? Not applicable  Patient was offered medication to assist with alcohol cessation at discharge? Not applicable  Was education for substance/alcohol abuse added to discharge instructions?  Not applicable    Patient discharged to Home; provided to the patient/caregiver either in hard copy or electronically.

## 2021-10-21 NOTE — GROUP NOTE
IP  GROUP DOCUMENTATION INDIVIDUAL                                                                          Group Therapy Note    Date: 10/21/2021    Group Start Time: 1110  Group End Time:1155    Group Topic: Education Group - Inpatient    [facility] 2 BH NON ACUTE    Marcia Disla    [facility] GROUP DOCUMENTATION GROUP    Group Therapy Note    Facilitated group session focused on introducing information on developing a thought record to help challenge negative thoughts and develop a more accurate view of a situation      Attendees: 6/10         Attendance: Attended    Patient's Goal:  Attend group daily     Interventions/techniques: Informed and Supported    Follows Directions: Followed directions    Interactions: Interacted appropriately    Mental Status: Calm    Behavior/appearance: Cooperative    Goals Achieved: Able to engage in interactions, Able to listen to others, Able to self-disclose and Identified distorted thoughts/beliefs      Additional Notes:  Receptive to information discussed on developing a thought record and was able to recognize examples of different negative thoughts. Pt was able to share a personal example when she was thinking negative about a stressful event  and was able to challenge it.     Joann Lyle, CTRS

## 2021-10-21 NOTE — BH NOTES
NURSING DISCHARGE NOTE    Discharged to home. Denied feeling depressed, and/or suicidal. Maricruz An she is anxious to be going home. Written f/u info given/explained. Rx were called into pharmacy by Dr. Lucina Delaney. Took all valuables and personal belongings. Escorted down to [employer] taxi, by Najma Ambrocio.
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
## 2022-03-18 PROBLEM — F29 PSYCHOSIS (HCC): Status: ACTIVE | Noted: 2021-10-14

## 2023-05-19 RX ORDER — HYDROXYZINE PAMOATE 25 MG/1
25 CAPSULE ORAL 3 TIMES DAILY PRN
COMMUNITY
Start: 2021-10-21

## 2023-05-19 RX ORDER — TRAZODONE HYDROCHLORIDE 50 MG/1
50 TABLET ORAL
COMMUNITY
Start: 2021-10-21

## 2023-05-19 RX ORDER — LURASIDONE HYDROCHLORIDE 40 MG/1
TABLET, FILM COATED ORAL
COMMUNITY
Start: 2021-10-21

## 2024-04-09 ENCOUNTER — OFFICE VISIT (OUTPATIENT)
Age: 34
End: 2024-04-09
Payer: COMMERCIAL

## 2024-04-09 VITALS — WEIGHT: 293 LBS | DIASTOLIC BLOOD PRESSURE: 68 MMHG | BODY MASS INDEX: 49.65 KG/M2 | SYSTOLIC BLOOD PRESSURE: 110 MMHG

## 2024-04-09 DIAGNOSIS — Z11.51 SCREENING FOR HPV (HUMAN PAPILLOMAVIRUS): ICD-10-CM

## 2024-04-09 DIAGNOSIS — L68.0 HIRSUTISM: ICD-10-CM

## 2024-04-09 DIAGNOSIS — N92.6 IRREGULAR PERIODS/MENSTRUAL CYCLES: ICD-10-CM

## 2024-04-09 DIAGNOSIS — Z01.419 WELL WOMAN EXAM WITH ROUTINE GYNECOLOGICAL EXAM: Primary | ICD-10-CM

## 2024-04-09 DIAGNOSIS — E28.2 PCOS (POLYCYSTIC OVARIAN SYNDROME): ICD-10-CM

## 2024-04-09 DIAGNOSIS — Z11.3 SCREENING EXAMINATION FOR STD (SEXUALLY TRANSMITTED DISEASE): ICD-10-CM

## 2024-04-09 LAB
EST. AVERAGE GLUCOSE BLD GHB EST-MCNC: 103 MG/DL
HBA1C MFR BLD: 5.2 % (ref 4–5.6)
HBV SURFACE AG SER QL: <0.1 INDEX
HBV SURFACE AG SER QL: NEGATIVE
HCV AB SER IA-ACNC: 0.1 INDEX
HCV AB SERPL QL IA: NONREACTIVE
HIV 1+2 AB+HIV1 P24 AG SERPL QL IA: NONREACTIVE
HIV 1/2 RESULT COMMENT: NORMAL
PROLACTIN SERPL-MCNC: 25.6 NG/ML
T4 FREE SERPL-MCNC: 1.1 NG/DL (ref 0.8–1.5)
TSH SERPL DL<=0.05 MIU/L-ACNC: 1.13 UIU/ML (ref 0.36–3.74)

## 2024-04-09 PROCEDURE — 99385 PREV VISIT NEW AGE 18-39: CPT | Performed by: OBSTETRICS & GYNECOLOGY

## 2024-04-09 RX ORDER — BUPROPION HYDROCHLORIDE 150 MG/1
150 TABLET ORAL NIGHTLY
COMMUNITY
Start: 2024-03-04

## 2024-04-09 NOTE — PROGRESS NOTES
Chief Complaint   Patient presents with    Annual Exam    New Patient     Laverne Max is a 34yo NEW PATIENT who presents today for an annual exam.  Her main concerns today include: none    Accepts std testing- cultures and bloodwork    Ob/Gyn Hx:  G0  LMP - No LMP recorded. (Menstrual status: IUD).  Menses - Absent    Contraception - Liletta IUD (unsure when was placed)  Hx of STI - HPV  SA - Yes    Last Pap: 2020 normal per patient; hx of ASCUS/HPV+ pap 2013  Gardasil: Never    1. Have you been to the ER, urgent care clinic, or hospitalized since your last visit?No    2. Have you seen or consulted any other health care providers outside of the [facility] since your last visit? No    Patient accepts chaperone.    Jacqueline Richardson LPN

## 2024-04-09 NOTE — PROGRESS NOTES
Annual exam    Laverne Max is a 33 y.o. G0 presenting for annual exam.      Accepts std testing- cultures and blood work.    Doing well with Liletta IUD.    Does report h/o AUB prior to IUD insertion, also with mild hirsuitism and difficulty losing weight. She has always wondered about possibility of PCOS but has never had any testing done.    BMI 49 - trying to lose weight doing low carb diet.     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 No plans for children.     Ob/Gyn Hx:  G0  LMP - No LMP recorded. (Menstrual status: IUD).  Menses - Absent    Contraception - Liletta IUD (unsure when was placed, was told it was due for removal in 2027)  Hx of STI - HPV  SA - Yes    Health maintenance:  Pap- 2020 normal per patient; hx of ASCUS/HPV+ pap 2013  Gardasil- never    Past Medical History:   Diagnosis Date    Anxiety     ASCUS with positive high risk HPV 9/20/2013    Depression     Psychiatric disorder        Past Surgical History:   Procedure Laterality Date    HERNIA REPAIR      childhood    UPPER ENDOSCOPY W/ ESOPHAGEAL MANOMETRY      2018       Family History   Problem Relation Age of Onset    Cancer Mother         stomach cancer    Heart Disease Father     Kidney Disease Father         with dialysis    Hypertension Paternal Uncle        Social History     Socioeconomic History    Marital status: Single     Spouse name: Not on file    Number of children: Not on file    Years of education: Not on file    Highest education level: Not on file   Occupational History    Not on file   Tobacco Use    Smoking status: Former    Smokeless tobacco: Never   Vaping Use    Vaping Use: Never used   Substance and Sexual Activity    Alcohol use: Yes     Comment: occasional    Drug use: No    Sexual activity: Yes     Birth control/protection: Pill   Other Topics Concern    Not on file   Social History Narrative    Not on file     Social Determinants of Health     Financial Resource

## 2024-04-10 LAB — T PALLIDUM AB SER QL IA: NON REACTIVE

## 2024-04-11 LAB — DHEA-S SERPL-MCNC: 123 UG/DL (ref 84.8–378)

## 2024-04-12 LAB
17OHP SERPL-MCNC: 84 NG/DL
TESTOST FREE SERPL-MCNC: 3 PG/ML (ref 0–4.2)
TESTOST SERPL-MCNC: 35 NG/DL (ref 8–60)

## 2024-04-14 LAB
C TRACH RRNA CVX QL NAA+PROBE: NEGATIVE
CYTOLOGIST CVX/VAG CYTO: NORMAL
CYTOLOGY CVX/VAG DOC CYTO: NORMAL
CYTOLOGY CVX/VAG DOC THIN PREP: NORMAL
DX ICD CODE: NORMAL
HPV GENOTYPE REFLEX: NORMAL
HPV I/H RISK 4 DNA CVX QL PROBE+SIG AMP: NEGATIVE
Lab: NORMAL
N GONORRHOEA RRNA CVX QL NAA+PROBE: NEGATIVE
OTHER STN SPEC: NORMAL
STAT OF ADQ CVX/VAG CYTO-IMP: NORMAL
T VAGINALIS RRNA SPEC QL NAA+PROBE: NEGATIVE